# Patient Record
Sex: FEMALE | Race: WHITE | Employment: FULL TIME | URBAN - METROPOLITAN AREA
[De-identification: names, ages, dates, MRNs, and addresses within clinical notes are randomized per-mention and may not be internally consistent; named-entity substitution may affect disease eponyms.]

---

## 2018-04-23 ENCOUNTER — APPOINTMENT (OUTPATIENT)
Dept: CT IMAGING | Age: 33
End: 2018-04-23

## 2018-04-23 ENCOUNTER — HOSPITAL ENCOUNTER (EMERGENCY)
Age: 33
Discharge: HOME | End: 2018-04-23
Attending: EMERGENCY MEDICINE

## 2018-04-23 VITALS
RESPIRATION RATE: 18 BRPM | DIASTOLIC BLOOD PRESSURE: 96 MMHG | HEIGHT: 65 IN | OXYGEN SATURATION: 99 % | WEIGHT: 232 LBS | SYSTOLIC BLOOD PRESSURE: 146 MMHG | HEART RATE: 98 BPM | BODY MASS INDEX: 38.65 KG/M2 | TEMPERATURE: 98.4 F

## 2018-04-23 DIAGNOSIS — M54.50 ACUTE EXACERBATION OF CHRONIC LOW BACK PAIN: ICD-10-CM

## 2018-04-23 DIAGNOSIS — M54.31 SCIATICA OF RIGHT SIDE: Primary | ICD-10-CM

## 2018-04-23 DIAGNOSIS — G89.29 ACUTE EXACERBATION OF CHRONIC LOW BACK PAIN: ICD-10-CM

## 2018-04-23 DIAGNOSIS — S39.012A BACK STRAIN, INITIAL ENCOUNTER: ICD-10-CM

## 2018-04-23 PROCEDURE — 6360000002 HC RX W HCPCS: Performed by: EMERGENCY MEDICINE

## 2018-04-23 PROCEDURE — 96372 THER/PROPH/DIAG INJ SC/IM: CPT

## 2018-04-23 PROCEDURE — 99283 EMERGENCY DEPT VISIT LOW MDM: CPT

## 2018-04-23 PROCEDURE — 72131 CT LUMBAR SPINE W/O DYE: CPT

## 2018-04-23 RX ORDER — METHYLPREDNISOLONE 4 MG/1
TABLET ORAL
Qty: 1 KIT | Status: SHIPPED | OUTPATIENT
Start: 2018-04-23 | End: 2018-04-29

## 2018-04-23 RX ORDER — KETOROLAC TROMETHAMINE 30 MG/ML
30 INJECTION, SOLUTION INTRAMUSCULAR; INTRAVENOUS ONCE
Status: COMPLETED | OUTPATIENT
Start: 2018-04-23 | End: 2018-04-23

## 2018-04-23 RX ORDER — METHOCARBAMOL 500 MG/1
500 TABLET, FILM COATED ORAL 4 TIMES DAILY
Qty: 40 TABLET | Refills: 0 | Status: SHIPPED | OUTPATIENT
Start: 2018-04-23 | End: 2018-05-03

## 2018-04-23 RX ADMIN — KETOROLAC TROMETHAMINE 30 MG: 30 INJECTION, SOLUTION INTRAMUSCULAR at 21:45

## 2018-04-23 ASSESSMENT — PAIN DESCRIPTION - PAIN TYPE
TYPE: ACUTE PAIN
TYPE: ACUTE PAIN

## 2018-04-23 ASSESSMENT — PAIN SCALES - GENERAL
PAINLEVEL_OUTOF10: 4
PAINLEVEL_OUTOF10: 8

## 2018-04-23 ASSESSMENT — PAIN DESCRIPTION - LOCATION
LOCATION: BACK
LOCATION: BACK

## 2018-04-23 ASSESSMENT — PAIN DESCRIPTION - ORIENTATION: ORIENTATION: RIGHT;LOWER

## 2018-04-23 ASSESSMENT — PAIN DESCRIPTION - DESCRIPTORS
DESCRIPTORS: DISCOMFORT
DESCRIPTORS: CONSTANT;SHARP

## 2019-09-04 ENCOUNTER — APPOINTMENT (OUTPATIENT)
Dept: GENERAL RADIOLOGY | Age: 34
End: 2019-09-04
Payer: COMMERCIAL

## 2019-09-04 ENCOUNTER — HOSPITAL ENCOUNTER (EMERGENCY)
Age: 34
Discharge: HOME | End: 2019-09-04
Attending: EMERGENCY MEDICINE
Payer: COMMERCIAL

## 2019-09-04 VITALS
WEIGHT: 236 LBS | BODY MASS INDEX: 39.32 KG/M2 | SYSTOLIC BLOOD PRESSURE: 126 MMHG | OXYGEN SATURATION: 99 % | TEMPERATURE: 98 F | HEART RATE: 96 BPM | RESPIRATION RATE: 16 BRPM | DIASTOLIC BLOOD PRESSURE: 70 MMHG | HEIGHT: 65 IN

## 2019-09-04 DIAGNOSIS — J06.9 ACUTE UPPER RESPIRATORY INFECTION: Primary | ICD-10-CM

## 2019-09-04 PROCEDURE — 71046 X-RAY EXAM CHEST 2 VIEWS: CPT

## 2019-09-04 PROCEDURE — 99283 EMERGENCY DEPT VISIT LOW MDM: CPT

## 2019-09-04 RX ORDER — AMOXICILLIN 875 MG/1
875 TABLET, COATED ORAL 2 TIMES DAILY
Qty: 20 TABLET | Refills: 0 | Status: SHIPPED | OUTPATIENT
Start: 2019-09-04 | End: 2019-09-14

## 2019-09-04 NOTE — ED PROVIDER NOTES
within the ED encounter and vital signs as below have been reviewed. /70   Pulse 96   Temp 98 °F (36.7 °C) (Oral)   Resp 16   Ht 5' 5\" (1.651 m)   Wt 236 lb (107 kg)   LMP 08/16/2019   SpO2 99%   BMI 39.27 kg/m²   Oxygen Saturation Interpretation: Normal    ---------------------------------------------------PHYSICAL EXAM--------------------------------------    Constitutional/General: Alert and oriented x3, well appearing, non toxic in NAD  Head: NC/AT  Eyes: PERRL, EOMI  Mouth: Oropharynx clear, handling secretions, no trismus  Neck: Supple, full ROM,   Pulmonary: Lungs clear to auscultation bilaterally, no wheezes, rales, or rhonchi. Not in respiratory distress  Cardiovascular:  Regular rate and rhythm, no murmurs, gallops, or rubs. 2+ distal pulses  Abdomen: Soft, non tender, non distended,   Extremities: Moves all extremities x 4. Warm and well perfused  Skin: warm and dry without rash  Neurologic: GCS 15,  Psych: Normal Affect      ------------------------------ ED COURSE/MEDICAL DECISION MAKING----------------------  Medications - No data to display    Medical Decision Making:    Pt with a Hx of HTN and chronic bronchitis presents for productive cough and nasal congestion. Imaging obtained, reviewed; No acute findings. Results discussed with patient. Pt will be d/c with prescriptions for amoxicillin and will follow up with her PCP. She is educated on signs and symptoms that require emergent evaluation. Pt is advised to return to the ED if her symptoms change or worsen. If her pain persists, pt may need further evaluation. Pt is agreeable to plan and all questions have been answered at this time. Counseling: The emergency provider has spoken with the patient and discussed todays results, in addition to providing specific details for the plan of care and counseling regarding the diagnosis and prognosis.   Questions are answered at this time and they are agreeable with the

## 2020-06-20 ENCOUNTER — APPOINTMENT (OUTPATIENT)
Dept: GENERAL RADIOLOGY | Age: 35
End: 2020-06-20
Payer: COMMERCIAL

## 2020-06-20 ENCOUNTER — HOSPITAL ENCOUNTER (EMERGENCY)
Age: 35
Discharge: HOME | End: 2020-06-20
Attending: EMERGENCY MEDICINE
Payer: COMMERCIAL

## 2020-06-20 VITALS
DIASTOLIC BLOOD PRESSURE: 84 MMHG | WEIGHT: 182 LBS | RESPIRATION RATE: 16 BRPM | HEIGHT: 65 IN | HEART RATE: 92 BPM | BODY MASS INDEX: 30.32 KG/M2 | TEMPERATURE: 98.4 F | OXYGEN SATURATION: 98 % | SYSTOLIC BLOOD PRESSURE: 136 MMHG

## 2020-06-20 PROCEDURE — 73562 X-RAY EXAM OF KNEE 3: CPT

## 2020-06-20 PROCEDURE — 99283 EMERGENCY DEPT VISIT LOW MDM: CPT

## 2020-06-20 PROCEDURE — 6370000000 HC RX 637 (ALT 250 FOR IP): Performed by: EMERGENCY MEDICINE

## 2020-06-20 RX ORDER — HYDROCODONE BITARTRATE AND ACETAMINOPHEN 5; 325 MG/1; MG/1
1 TABLET ORAL EVERY 6 HOURS PRN
Qty: 12 TABLET | Refills: 0 | Status: SHIPPED | OUTPATIENT
Start: 2020-06-20 | End: 2020-06-23

## 2020-06-20 RX ORDER — IBUPROFEN 600 MG/1
600 TABLET ORAL ONCE
Status: COMPLETED | OUTPATIENT
Start: 2020-06-20 | End: 2020-06-20

## 2020-06-20 RX ADMIN — IBUPROFEN 600 MG: 600 TABLET, FILM COATED ORAL at 19:22

## 2020-06-20 ASSESSMENT — PAIN DESCRIPTION - DESCRIPTORS: DESCRIPTORS: ACHING

## 2020-06-20 ASSESSMENT — PAIN SCALES - GENERAL
PAINLEVEL_OUTOF10: 6
PAINLEVEL_OUTOF10: 6

## 2020-06-20 ASSESSMENT — PAIN DESCRIPTION - LOCATION: LOCATION: KNEE

## 2020-06-20 ASSESSMENT — PAIN DESCRIPTION - ORIENTATION: ORIENTATION: RIGHT

## 2020-06-20 NOTE — ED PROVIDER NOTES
analysis:[id number]}      ---------------------------------------------------PHYSICAL EXAM--------------------------------------      Constitutional/General: Alert and oriented x3, well appearing, non toxic in NAD  Head: Normocephalic and atraumatic  Eyes: PERRL, EOMI  Mouth: Oropharynx clear, handling secretions, no trismus  Neck: Supple, full ROM,   Pulmonary: Lungs clear to auscultation bilaterally, no wheezes, rales, or rhonchi. Not in respiratory distress  Cardiovascular:  Regular rate and rhythm, no murmurs, gallops, or rubs. 2+ distal pulses  Abdomen: Soft, non tender, non distended,   Extremities: Moves all extremities x 4. Warm and well perfused  Skin: warm and dry without rash  Neurologic: GCS 15,  Psych: Normal Affect      ------------------------------ ED COURSE/MEDICAL DECISION MAKING----------------------  Medications - No data to display      ED COURSE:       Medical Decision Making:    ***    Counseling: The emergency provider has spoken with the {Persons; family members:[id number]} and discussed todays results, in addition to providing specific details for the plan of care and counseling regarding the diagnosis and prognosis. Questions are answered at this time and they are agreeable with the plan.      --------------------------------- IMPRESSION AND DISPOSITION ---------------------------------    IMPRESSION  No diagnosis found. DISPOSITION  Disposition: {Plan; disposition:53454}  Patient condition is {condition:86890}      NOTE: This report was transcribed using voice recognition software.  Every effort was made to ensure accuracy; however, inadvertent computerized transcription errors may be present

## 2020-06-30 ENCOUNTER — TELEPHONE (OUTPATIENT)
Dept: ORTHOPEDIC SURGERY | Age: 35
End: 2020-06-30

## 2020-06-30 NOTE — TELEPHONE ENCOUNTER
Pt was seen in the ED on 06/20/2020 for knee sprain and needs a F/U appt. Pt can be reached at [phone]. Thank you.

## 2020-07-10 ENCOUNTER — HOSPITAL ENCOUNTER (OUTPATIENT)
Dept: GENERAL RADIOLOGY | Age: 35
Discharge: HOME | End: 2020-07-12
Payer: COMMERCIAL

## 2020-07-10 ENCOUNTER — OFFICE VISIT (OUTPATIENT)
Dept: ORTHOPEDIC SURGERY | Age: 35
End: 2020-07-10
Payer: COMMERCIAL

## 2020-07-10 VITALS
HEIGHT: 65 IN | HEART RATE: 85 BPM | BODY MASS INDEX: 29.99 KG/M2 | DIASTOLIC BLOOD PRESSURE: 92 MMHG | SYSTOLIC BLOOD PRESSURE: 141 MMHG | WEIGHT: 180 LBS

## 2020-07-10 PROCEDURE — 99203 OFFICE O/P NEW LOW 30 MIN: CPT | Performed by: PHYSICIAN ASSISTANT

## 2020-07-10 PROCEDURE — G8427 DOCREV CUR MEDS BY ELIG CLIN: HCPCS | Performed by: PHYSICIAN ASSISTANT

## 2020-07-10 PROCEDURE — G8419 CALC BMI OUT NRM PARAM NOF/U: HCPCS | Performed by: PHYSICIAN ASSISTANT

## 2020-07-10 PROCEDURE — 4004F PT TOBACCO SCREEN RCVD TLK: CPT | Performed by: PHYSICIAN ASSISTANT

## 2020-07-10 PROCEDURE — 99202 OFFICE O/P NEW SF 15 MIN: CPT | Performed by: PHYSICIAN ASSISTANT

## 2020-07-10 PROCEDURE — 73562 X-RAY EXAM OF KNEE 3: CPT

## 2020-07-10 NOTE — PROGRESS NOTES
reviewed. No pertinent family history. REVIEW OF SYSTEMS:   Skin: no abnormal pigmentation, rash  Eyes: no blurring or eye pain   Ears/Nose/Throat: no hearing loss, tinnitus  Respiratory: No increased work of breathing, no coughing  Cardiovascular: Brisk capillary refill bilaterally, well perfused extremities  Gastrointestinal: no nausea, vomiting  Neurologic: no paralysis, or seizures  Musculoskeletal: Pain to right knee      PHYSICAL EXAM:    VITALS:  BP (!) 141/92 (Site: Left Upper Arm, Position: Sitting, Cuff Size: Medium Adult)   Pulse 85   Ht 5' 5\" (1.651 m)   Wt 180 lb (81.6 kg)   LMP 06/12/2020   BMI 29.95 kg/m²   Constitutional: Oriented to person, place, and time; Answer questions appropriately  HENT: Head: Normocephalic and atraumatic. Eyes: EOMI, ROSALIND  Neck: Supple, trachea midline  Cardiovascular: Brisk capillary refill to all extremities, extremities well perfused  Pulmonary/Chest: No increased work of breathing, no cough  Abdominal: Non-tender, non-distended  Neurologic:  Awake, alert and oriented in three planes.  No gross deficits   Musculoskeletal:    Right lower extremity:  · Skin intact  · No ecchymosis  · Soft tissue swelling about the knee  · Range of motion from 0-110 with pain  · No crepitance about the patella  · Positive tenderness palpation about the patella and about the joint lines medial and lateral  · Very mild palpable effusion  · Stable to varus/valgus stress but there is pain  · Lockman's with stable endpoint but with pain  · Ba's elicits pain laterally  · Motor function intact to PF/DF/EHL  · Sensation intact light touch DP/SP/T/S/S nerve distributions  · DP pulse 2+        DATA:    CBC:   Lab Results   Component Value Date    WBC 9.9 01/24/2017    RBC 4.75 01/24/2017    HGB 13.2 01/24/2017    HCT 40.6 01/24/2017    MCV 85.5 01/24/2017    MCH 27.8 01/24/2017    MCHC 32.5 01/24/2017    RDW 12.5 01/24/2017     01/24/2017    MPV 10.6 01/24/2017     Radiology Review:   XR knee right:  Demonstrates no acute fracture dislocation. There is no appreciable effusion on x-ray. IMPRESSION:  Nonspecific knee sprain    PLAN:  Discussed findings with the patient. Her exam is very nonspecific but there is pain throughout. Ba's does elicit pain in the lateral compartment. She has had increasing pain without illness will obtain MRI to evaluate for possible meniscus tear  If MRI does not demonstrate operative issues will consider corticosteroid injection to the knee and recommend some physical therapy  Patient is agreeable with this plan and will obtain MRI  Return for follow-up after MRI  Keep ice and elevation to the knee  Ace wrap given for compression      I have seen and evaluated the patient with the resident physician and agree with the above assessments on today's visit. I have performed the key components of the history and physical examination and concur completely with the findings and plans as documented above. My personal evaluation and documentation is as below:      SUBJECTIVE: Nilsa Tello is a 37 y.o.  female who presents for right knee pain that has been worsening for several weeks. No improvement with conservative treatments, pain with knee flexion. C/o popping and instability to the knee, no sensation of knee locking. No prior history of issue with right knee. Feeling well otherwise     ROS: All pertinent review of systems are as listed above in HPI and and resident documentation    OBJECTIVE:   Alert and oriented X 3, no acute distress, respirations easy and unlabored with no audible wheezes, skin warm and dry, speech and dress appropriate for noted age, affect euthymic.   Extremity:  Right knee  Skin is C/D/I  No significant effusion  AROM of the knee 0-110 but with pain at terminal ROM  Pain reproduced with McMurrays but no mechanical symptoms  A/P drawer with firm endpoint  TTP about posterior lateral joint line and patella  NVI  Calf supple and nontender    ASSESSMENT:   Diagnosis Orders   1. Acute internal derangement of knee, right  MRI Knee Right WO Contrast       PLAN:  MRI right knee, mechanical symptoms concerning for mensicus injury  Discussed treatment algorithm  Continue ice/nsaids/compression  WBAT  DC KI  Follow up after MRI     Electronically signed by Orrie Cranker, PA-C on 7/12/2020 at 4:54 PM  Note: This report was completed using [employer] voiced recognition software. Every effort has been made to ensure accuracy; however, inadvertent computerized transcription errors may be present.

## 2020-07-21 ENCOUNTER — HOSPITAL ENCOUNTER (OUTPATIENT)
Dept: MRI IMAGING | Age: 35
Discharge: HOME | End: 2020-07-23
Payer: COMMERCIAL

## 2020-07-21 PROCEDURE — 73721 MRI JNT OF LWR EXTRE W/O DYE: CPT

## 2020-07-22 ENCOUNTER — TELEPHONE (OUTPATIENT)
Dept: ORTHOPEDIC SURGERY | Age: 35
End: 2020-07-22

## 2020-07-22 NOTE — TELEPHONE ENCOUNTER
See in office, OK to see sometime next week  Electronically signed by Nuha Kong PA-C on 7/22/2020 at 2:48 PM

## 2020-08-04 ENCOUNTER — HOSPITAL ENCOUNTER (EMERGENCY)
Age: 35
Discharge: HOME | End: 2020-08-04
Attending: EMERGENCY MEDICINE
Payer: COMMERCIAL

## 2020-08-04 VITALS
TEMPERATURE: 98.3 F | DIASTOLIC BLOOD PRESSURE: 71 MMHG | HEART RATE: 87 BPM | WEIGHT: 180 LBS | RESPIRATION RATE: 14 BRPM | BODY MASS INDEX: 29.95 KG/M2 | OXYGEN SATURATION: 95 % | SYSTOLIC BLOOD PRESSURE: 139 MMHG

## 2020-08-04 PROCEDURE — 96374 THER/PROPH/DIAG INJ IV PUSH: CPT

## 2020-08-04 PROCEDURE — 6360000002 HC RX W HCPCS: Performed by: EMERGENCY MEDICINE

## 2020-08-04 PROCEDURE — 99284 EMERGENCY DEPT VISIT MOD MDM: CPT

## 2020-08-04 PROCEDURE — 96372 THER/PROPH/DIAG INJ SC/IM: CPT

## 2020-08-04 PROCEDURE — 6360000002 HC RX W HCPCS

## 2020-08-04 RX ORDER — LORAZEPAM 2 MG/ML
INJECTION INTRAMUSCULAR
Status: COMPLETED
Start: 2020-08-04 | End: 2020-08-04

## 2020-08-04 RX ORDER — NALOXONE HYDROCHLORIDE 0.4 MG/ML
INJECTION, SOLUTION INTRAMUSCULAR; INTRAVENOUS; SUBCUTANEOUS
Status: DISCONTINUED
Start: 2020-08-04 | End: 2020-08-04 | Stop reason: HOSPADM

## 2020-08-04 RX ORDER — LORAZEPAM 2 MG/ML
1 INJECTION INTRAMUSCULAR ONCE
Status: COMPLETED | OUTPATIENT
Start: 2020-08-04 | End: 2020-08-04

## 2020-08-04 RX ORDER — NALOXONE HYDROCHLORIDE 0.4 MG/ML
0.4 INJECTION, SOLUTION INTRAMUSCULAR; INTRAVENOUS; SUBCUTANEOUS ONCE
Status: COMPLETED | OUTPATIENT
Start: 2020-08-04 | End: 2020-08-04

## 2020-08-04 RX ADMIN — NALOXONE HYDROCHLORIDE 0.4 MG: 0.4 INJECTION, SOLUTION INTRAMUSCULAR; INTRAVENOUS; SUBCUTANEOUS at 18:59

## 2020-08-04 RX ADMIN — LORAZEPAM 1 MG: 2 INJECTION INTRAMUSCULAR at 18:18

## 2020-08-04 RX ADMIN — LORAZEPAM 1 MG: 2 INJECTION, SOLUTION INTRAMUSCULAR; INTRAVENOUS at 18:18

## 2020-08-04 NOTE — ED PROVIDER NOTES
antibiotics    -------------------------------------------------- RESULTS -------------------------------------------------  All laboratory and radiology results have been personally reviewed by myself   LABS:  No results found for this visit on 08/04/20. RADIOLOGY:  Interpreted by Radiologist.  No orders to display       ------------------------- NURSING NOTES AND VITALS REVIEWED ---------------------------   The nursing notes within the ED encounter and vital signs as below have been reviewed. /71   Pulse 87   Temp 98.3 °F (36.8 °C) (Oral)   Resp 14   Wt 180 lb (81.6 kg)   LMP 07/05/2020   SpO2 95%   BMI 29.95 kg/m²   Oxygen Saturation Interpretation: Normal      ---------------------------------------------------PHYSICAL EXAM--------------------------------------    Constitutional/General: Alert and oriented x3, well appearing, non toxic in NAD, patient is extremely anxious and upset. Crying. Head: Normocephalic and atraumatic  Eyes: PERRL, EOMI, conjunctiva normal, sclera non icteric  Mouth: Oropharynx clear, handling secretions, no trismus, no asymmetry of the posterior oropharynx or uvular edema  Neck: Supple, full ROM, non tender to palpation in the midline, no stridor, no crepitus, no meningeal signs  Respiratory: Lungs clear to auscultation bilaterally, no wheezes, rales, or rhonchi. Not in respiratory distress. Patient is tachypneic secondary to anxiety attack  Cardiovascular:  Regular rate. Regular rhythm. No murmurs, gallops, or rubs. 2+ distal pulses  Chest: No chest wall tenderness  GI:  Abdomen Soft, Non tender, Non distended. +BS. No organomegaly, no palpable masses,  No rebound, guarding, or rigidity. Musculoskeletal: Moves all extremities x 4. Warm and well perfused, no clubbing, cyanosis, or edema. Capillary refill <3 seconds  Integument: skin warm and dry. No rashes.    Neurologic: GCS 15, no focal deficits, symmetric strength 5/5 in the upper and lower extremities bilaterally  Psychiatric: Anxious appearing      ------------------------------ ED COURSE/MEDICAL DECISION MAKING----------------------  Medications   naloxone (NARCAN) 0.4 MG/ML injection (  Canceled Entry 20)   LORazepam (ATIVAN) injection 1 mg (1 mg Intramuscular Given 20)   naloxone (NARCAN) injection 0.4 mg (0.4 mg Intravenous Given 20)         Medical Decision Makin  Patient will be observed in the ED to make sure that there is no recurrence of apnea/hypoxia after Narcan wears off. Will give patient IM Ativan for anxiety. Counseling: The emergency provider has spoken with the patient and discussed todays results, in addition to providing specific details for the plan of care and counseling regarding the diagnosis and prognosis. Questions are answered at this time and they are agreeable with the plan.      --------------------------------- IMPRESSION AND DISPOSITION ---------------------------------    IMPRESSION  1.  Opiate overdose, accidental or unintentional, initial encounter [facility])        DISPOSITION  Disposition: Discharge to home  Patient condition is good                 Sajan Juares DO  [phone]

## 2020-08-04 NOTE — ED NOTES
Bed: H5  Expected date:   Expected time:   Means of arrival:   Comments:  [address] [place] [address] Shania Reis RN  08/04/20 5650

## 2020-08-11 ENCOUNTER — OFFICE VISIT (OUTPATIENT)
Dept: ORTHOPEDIC SURGERY | Age: 35
End: 2020-08-11
Payer: COMMERCIAL

## 2020-08-11 VITALS
TEMPERATURE: 99.5 F | HEIGHT: 65 IN | DIASTOLIC BLOOD PRESSURE: 88 MMHG | BODY MASS INDEX: 32.49 KG/M2 | SYSTOLIC BLOOD PRESSURE: 141 MMHG | HEART RATE: 81 BPM | WEIGHT: 195 LBS

## 2020-08-11 PROBLEM — S86.911D KNEE STRAIN, RIGHT, SUBSEQUENT ENCOUNTER: Status: ACTIVE | Noted: 2020-08-11

## 2020-08-11 PROCEDURE — 4004F PT TOBACCO SCREEN RCVD TLK: CPT | Performed by: ORTHOPAEDIC SURGERY

## 2020-08-11 PROCEDURE — 99213 OFFICE O/P EST LOW 20 MIN: CPT | Performed by: ORTHOPAEDIC SURGERY

## 2020-08-11 PROCEDURE — G8417 CALC BMI ABV UP PARAM F/U: HCPCS | Performed by: ORTHOPAEDIC SURGERY

## 2020-08-11 PROCEDURE — 99212 OFFICE O/P EST SF 10 MIN: CPT | Performed by: ORTHOPAEDIC SURGERY

## 2020-08-11 PROCEDURE — G8427 DOCREV CUR MEDS BY ELIG CLIN: HCPCS | Performed by: ORTHOPAEDIC SURGERY

## 2020-08-11 NOTE — PROGRESS NOTES
Orthopedic Trauma Surgery  Office Note      CHIEF COMPLAINT:   Chief Complaint   Patient presents with    Knee Pain      Reports pain persisting over the past month. Denies injury/known trauma. To review MRI results this day.  Pain      Reports pain increases as the day day goes on. HISTORY OF PRESENT ILLNESS:                The patient is a 37 y.o. female who presents for follow-up evaluation for her right knee. She has had recent MRI and is here today to discuss and review. States the knee pain is slowly improving. Denies any mechanical symptoms such as catching or locking. States she has good ROM to the knee. Prolonged standing or deep bending still gives her some discomfort. She has been ambulating without assistive device. Works at a local [employer] states she is running back and forth throughout her shifts and has soreness to the knee thereafter. Past Medical History:        Diagnosis Date    H/O chronic bronchitis     most recent in 2013    HTN (hypertension)     Knee strain, right, subsequent encounter 2020    Mental disorder     Migraine     Pregnancy     [facility]    Sciatica      Past Surgical History:        Procedure Laterality Date     SECTION       SECTION      CHOLECYSTECTOMY      CHOLECYSTECTOMY, LAPAROSCOPIC N/A 10-10-13    HERNIA REPAIR      TUBAL LIGATION       Current Medications:   No current facility-administered medications for this visit. Allergies:  Sulfa antibiotics    Social History:   TOBACCO:   reports that she has been smoking. She has a 5.00 pack-year smoking history. She has never used smokeless tobacco.  ETOH:   reports no history of alcohol use. DRUGS:   reports no history of drug use. ACTIVITIES OF DAILY LIVING:    OCCUPATION:    Family History:   History reviewed. No pertinent family history.     REVIEW OF SYSTEMS:   Skin: no abnormal pigmentation, rash  Eyes: no blurring or eye pain   Ears/Nose/Throat: no hearing loss, tinnitus  Respiratory: No increased work of breathing, no coughing  Cardiovascular: Brisk capillary refill bilaterally, well perfused extremities  Gastrointestinal: no nausea, vomiting  Neurologic: no paralysis, or seizures  Musculoskeletal: Pain to right knee      PHYSICAL EXAM:    VITALS:  BP (!) 141/88   Pulse 81   Temp 99.5 °F (37.5 °C)   Ht 5' 5\" (1.651 m)   Wt 195 lb (88.5 kg)   BMI 32.45 kg/m²   Constitutional: Oriented to person, place, and time; Answer questions appropriately  HENT: Head: Normocephalic and atraumatic. Eyes: EOMI, ROSALIND  Neck: Supple, trachea midline  Cardiovascular: Brisk capillary refill to all extremities, extremities well perfused  Pulmonary/Chest: No increased work of breathing, no cough  Abdominal: Non-tender, non-distended  Neurologic:  Awake, alert and oriented in three planes. No gross deficits   Musculoskeletal:    Right lower extremity:  · Skin intact  · No ecchymosis, no effusion  · No focal tenderness to palpation, mild global tenderness peripatellar proximal to the joint line, negative for medial lateral joint line tenderness  · Range of motion from 0-115 actively without pain  · No crepitance about the patella  · Stable to varus/valgus stress but there is pain  · Lachman's with stable endpoint   · Ba's negative  · Motor function intact to PF/DF/EHL  · Sensation intact light touch DP/SP/T/S/S nerve distributions  · DP pulse 2+  · Calf supple nontender        DATA:    CBC:   Lab Results   Component Value Date    WBC 9.9 2017    RBC 4.75 2017    HGB 13.2 2017    HCT 40.6 2017    MCV 85.5 2017    MCH 27.8 2017    MCHC 32.5 2017    RDW 12.5 2017     2017    MPV 10.6 2017     Radiology Review:   XR knee right:  Demonstrates no acute fracture dislocation. There is no appreciable effusion on x-ray.   Narrative    Patient MRN:  [id number]    : 1985    Age: 37 years    Gender: Female      Order Date:  7/21/2020 6:28 PM         EXAM: MRI KNEE RIGHT WO CONTRAST         INDICATION: M23.91 Acute internal derangement of knee, right           COMPARISON: Right knee x-ray 6/20/2020 and right knee x-ray 7/10/2020.         TECHNIQUE: 7 MRI sequences of the knee without IV contrast. Axial T2    fat-sat. Coronal T1, PD and PD fat sat. Sagittal PD, PD fat sat, and    ACL sequence.                   FINDINGS:      There is mild bone marrow edema consistent with a healing bone    contusion involving the lateral margin of the lateral femoral condyle. There is also faint edema along the posterior lateral proximal tibial    margin consistent with a healing bone contusion. The tibial injury    parallels the proximal tibiofibular joint. No edema in the medial    femoral condyle or medial proximal tibia. A very small knee joint    effusion. No edema of the patella. Normal patellar position and normal    thickness of the quadriceps and infrapatellar tendons.         The lateral meniscus shows slight increased intrameniscal signal along    the superior meniscocapsular attachment adjacent to the area of    femoral edema. However a discrete tear is not seen.         There is slight thickening of the lateral collateral ligament at the    femoral attachment without any edema. This could be old healed injury.         Normal appearance of the anterior and posterior cruciate ligaments. Normal appearance of the medial collateral ligament and Medial    meniscus.         There is no degenerative narrowing of the medial or lateral    compartment. No obvious cartilage loss. Minimal calf edema noted.                        Impression         1. Faint residual of bone contusions in the lateral femoral condyle    and posterior lateral proximal tibial margins.              2.  Slight thickening of the lateral collateral ligament attachment to    the lateral femoral condyle, but without any active edema and this    looks probably healed.         3. No definite meniscal tear. 4. Tiny joint effusion. IMPRESSION:  Nonspecific knee sprain    PLAN:  Reviewed MRI results with the patient discussed her underlying bony contusions however ligamentously knee appears to be stable and there is no obvious meniscal injury   Recommend patient obtains supportive knee brace, she can continue with NSAIDs and ice  Discussed activity modifications  Patient to follow-up in office in 6 weeks for repeat evaluation or sooner if needed    Electronically signed by Jocelyn Gilmore DO on 8/11/2020     Note: This report was completed using [employer] voiced recognition software. Every effort has been made to ensure accuracy; however, inadvertent computerized transcription errors may be present.

## 2020-09-23 ENCOUNTER — TELEPHONE (OUTPATIENT)
Dept: ORTHOPEDIC SURGERY | Age: 35
End: 2020-09-23

## 2020-09-23 NOTE — TELEPHONE ENCOUNTER
Left message to call the office, we need to move her appointment, Dr. Anthony Juares will not be in the office after 1:30.     Electronically signed by Teddy Sanon MA on 9/23/2020 at 3:46 PM

## 2020-09-24 NOTE — TELEPHONE ENCOUNTER
Third attempt to reach patient. Left message.   Electronically signed by Dudley Rivas MA on 9/24/2020 at 1:02 PM

## 2020-12-05 ENCOUNTER — APPOINTMENT (OUTPATIENT)
Dept: GENERAL RADIOLOGY | Age: 35
End: 2020-12-05
Payer: COMMERCIAL

## 2020-12-05 ENCOUNTER — HOSPITAL ENCOUNTER (EMERGENCY)
Age: 35
Discharge: HOME | End: 2020-12-05
Attending: EMERGENCY MEDICINE
Payer: COMMERCIAL

## 2020-12-05 VITALS
WEIGHT: 202 LBS | TEMPERATURE: 98.3 F | SYSTOLIC BLOOD PRESSURE: 138 MMHG | RESPIRATION RATE: 16 BRPM | HEIGHT: 65 IN | BODY MASS INDEX: 33.66 KG/M2 | HEART RATE: 79 BPM | OXYGEN SATURATION: 98 % | DIASTOLIC BLOOD PRESSURE: 78 MMHG

## 2020-12-05 LAB
ANION GAP SERPL CALCULATED.3IONS-SCNC: 10 MMOL/L (ref 7–16)
BASOPHILS ABSOLUTE: 0.03 E9/L (ref 0–0.2)
BASOPHILS RELATIVE PERCENT: 0.3 % (ref 0–2)
BUN BLDV-MCNC: 16 MG/DL (ref 6–20)
CALCIUM SERPL-MCNC: 9.2 MG/DL (ref 8.6–10.2)
CHLORIDE BLD-SCNC: 106 MMOL/L (ref 98–107)
CO2: 20 MMOL/L (ref 22–29)
CREAT SERPL-MCNC: 0.6 MG/DL (ref 0.5–1)
D DIMER: <200 NG/ML DDU
EOSINOPHILS ABSOLUTE: 0.06 E9/L (ref 0.05–0.5)
EOSINOPHILS RELATIVE PERCENT: 0.6 % (ref 0–6)
GFR AFRICAN AMERICAN: >60
GFR NON-AFRICAN AMERICAN: >60 ML/MIN/1.73
GLUCOSE BLD-MCNC: 99 MG/DL (ref 74–99)
HCG, URINE, POC: NEGATIVE
HCT VFR BLD CALC: 42.8 % (ref 34–48)
HEMOGLOBIN: 14.8 G/DL (ref 11.5–15.5)
IMMATURE GRANULOCYTES #: 0.03 E9/L
IMMATURE GRANULOCYTES %: 0.3 % (ref 0–5)
LYMPHOCYTES ABSOLUTE: 2.54 E9/L (ref 1.5–4)
LYMPHOCYTES RELATIVE PERCENT: 25.5 % (ref 20–42)
Lab: NORMAL
MCH RBC QN AUTO: 30 PG (ref 26–35)
MCHC RBC AUTO-ENTMCNC: 34.6 % (ref 32–34.5)
MCV RBC AUTO: 86.8 FL (ref 80–99.9)
MONOCYTES ABSOLUTE: 0.55 E9/L (ref 0.1–0.95)
MONOCYTES RELATIVE PERCENT: 5.5 % (ref 2–12)
NEGATIVE QC PASS/FAIL: NORMAL
NEUTROPHILS ABSOLUTE: 6.77 E9/L (ref 1.8–7.3)
NEUTROPHILS RELATIVE PERCENT: 67.8 % (ref 43–80)
PDW BLD-RTO: 11.9 FL (ref 11.5–15)
PLATELET # BLD: 325 E9/L (ref 130–450)
PMV BLD AUTO: 10.4 FL (ref 7–12)
POSITIVE QC PASS/FAIL: NORMAL
POTASSIUM REFLEX MAGNESIUM: 3.9 MMOL/L (ref 3.5–5)
RBC # BLD: 4.93 E12/L (ref 3.5–5.5)
SODIUM BLD-SCNC: 136 MMOL/L (ref 132–146)
TROPONIN: <0.01 NG/ML (ref 0–0.03)
WBC # BLD: 10 E9/L (ref 4.5–11.5)

## 2020-12-05 PROCEDURE — 84484 ASSAY OF TROPONIN QUANT: CPT

## 2020-12-05 PROCEDURE — U0003 INFECTIOUS AGENT DETECTION BY NUCLEIC ACID (DNA OR RNA); SEVERE ACUTE RESPIRATORY SYNDROME CORONAVIRUS 2 (SARS-COV-2) (CORONAVIRUS DISEASE [COVID-19]), AMPLIFIED PROBE TECHNIQUE, MAKING USE OF HIGH THROUGHPUT TECHNOLOGIES AS DESCRIBED BY CMS-2020-01-R: HCPCS

## 2020-12-05 PROCEDURE — 80048 BASIC METABOLIC PNL TOTAL CA: CPT

## 2020-12-05 PROCEDURE — 2580000003 HC RX 258: Performed by: EMERGENCY MEDICINE

## 2020-12-05 PROCEDURE — 93005 ELECTROCARDIOGRAM TRACING: CPT | Performed by: EMERGENCY MEDICINE

## 2020-12-05 PROCEDURE — 85378 FIBRIN DEGRADE SEMIQUANT: CPT

## 2020-12-05 PROCEDURE — 85025 COMPLETE CBC W/AUTO DIFF WBC: CPT

## 2020-12-05 PROCEDURE — 99284 EMERGENCY DEPT VISIT MOD MDM: CPT

## 2020-12-05 PROCEDURE — 71045 X-RAY EXAM CHEST 1 VIEW: CPT

## 2020-12-05 RX ORDER — 0.9 % SODIUM CHLORIDE 0.9 %
1000 INTRAVENOUS SOLUTION INTRAVENOUS ONCE
Status: COMPLETED | OUTPATIENT
Start: 2020-12-05 | End: 2020-12-05

## 2020-12-05 RX ADMIN — SODIUM CHLORIDE 1000 ML: 9 INJECTION, SOLUTION INTRAVENOUS at 13:18

## 2020-12-05 NOTE — ED PROVIDER NOTES
Patient is a 22-year-old female with past medical history of mood disorder, hypertension, migraines presented to the emergency department with shortness of breath. Symptoms moderate in severity and constant since onset. Patient states yesterday she felt very fatigued and what is lying on the house. She had no other complaints at the time is just feeling tired. States she woke up today and while she was standing in the kitchen had sudden onset of shortness of breath. She has been short of breath since this started this morning. She has no history of lung problems and no underlying lung disease. She is also complaining of some tightness in her chest and that is when she feels short of breath. She has no pleuritic chest pain and states that exertion does not make the shortness of breath or chest pain worse. It is \"just there all the time\". The chest pain is described as a tightness in the middle of her chest, does not radiate anywhere, she has never had this before. She does have a history of panic attacks and states this does feel somewhat different. She denies new any new stressors at home but states she has had stress at work but that is not new. No one around her has been sick. Of note she states she did wake up last night and have subjective fevers throughout the night and chills. She tried nothing at home for her symptoms and cannot recall anything makes her symptoms better or worse, there is always there. She has no known sick contacts. She denies any exposure to COVID-19. She denies any history of DVTs or PEs. She denies any leg pain, leg swelling, hemoptysis, recent surgery, long travel. Review of Systems   Constitutional: Positive for chills, fatigue and fever (subjective). HENT: Negative for congestion, sore throat and trouble swallowing. Eyes: Negative for pain. Respiratory: Positive for chest tightness and shortness of breath. Negative for cough and wheezing. Cardiovascular: Positive for chest pain. Negative for palpitations and leg swelling. Gastrointestinal: Negative for abdominal pain, diarrhea, nausea and vomiting. Genitourinary: Negative for dysuria and frequency. Musculoskeletal: Positive for myalgias. Negative for neck pain and neck stiffness. Skin: Negative for rash and wound. Neurological: Negative for weakness and headaches. All other systems reviewed and are negative. Physical Exam  Vitals signs and nursing note reviewed. Constitutional:       General: She is not in acute distress. Appearance: She is well-developed. She is not ill-appearing. Comments: Tearful on exam   HENT:      Head: Normocephalic and atraumatic. Nose: No congestion or rhinorrhea. Eyes:      Extraocular Movements: Extraocular movements intact. Pupils: Pupils are equal, round, and reactive to light. Neck:      Musculoskeletal: Normal range of motion and neck supple. No muscular tenderness. Cardiovascular:      Rate and Rhythm: Regular rhythm. Tachycardia present. Pulses: Normal pulses. Pulmonary:      Effort: Pulmonary effort is normal. No respiratory distress. Breath sounds: Normal breath sounds. No wheezing or rales. Abdominal:      Palpations: Abdomen is soft. Tenderness: There is no abdominal tenderness. There is no guarding or rebound. Musculoskeletal:         General: No swelling or tenderness. Skin:     General: Skin is warm and dry. Capillary Refill: Capillary refill takes less than 2 seconds. Neurological:      Mental Status: She is alert and oriented to person, place, and time. Psychiatric:      Comments: Tearful on exam, mildly flat affect          Procedures     MDM  Number of Diagnoses or Management Options  COVID-19 virus test result unknown:   Shortness of breath:   Viral illness:   Patient presents emergency department for shortness of breath.   She is clinically stable, tachycardic and nontoxic-appearing. History and physical exam less concerning for acute ACS and EKG with no ischemic changes and troponin less than 0.01. She is Wells low risk but not PERC negative secondary to tachycardia so D-dimer was performed and less than 200 making acute PE less likely. Rest of patient's lab work essentially unremarkable. Patient was tearful on exam and has flat affect and history of panic attacks, there is possibility that she had a panic attack this morning but does not feel like prior tracks. Symptoms also somewhat concerning for viral infection including but not limited to COVID-19. Patient will be sent an outpatient swab. With reassuring lab work and imaging studies, patient okay for close outpatient follow-up. Educated patient on symptoms, diagnosis and supportive care at home. Instructed this could be COVID-19 and long discussion had about self proning and strict return precautions discussed. With patient not hypoxic, no recommendations for steroids at this time. Patient verbalized understanding is agreeable with the plan. All questions were answered. Patient was discharged. ED Course as of Dec 06 0927   Sat Dec 05, 2020   1322 ATTENDING PROVIDER ATTESTATION:     Rodolfo Moreno presented to the emergency department for evaluation of [unfilled]  I have reviewed and discussed the case, including pertinent history (medical, surgical, family and social) and exam findings with the Midlevel and the Nurse assigned to Rodolfo Moreno. I have personally performed and/or participated in the history, exam, medical decision making, and procedures and agree with all pertinent clinical information. I have reviewed my findings and recommendations with Rodolfo oMreno and members of family present at the time of disposition. My findings/plan: Patient is a 81-year-old female who presents the chief complaint of sudden onset of chest tightness and shortness of breath.   Patient states that yesterday she started having some chest tightness that resolved but then this morning she was making breakfast for her younger son she had onset of substernal chest tightness, states that she feels like someone is squeezing her chest.  She also missed to having tingling sensations of her fingertips. She denies any associated lightheadedness, dizziness, abdominal pain, nausea, vomiting. Patient states that she does have some stress at work but otherwise at home there is nothing that is more stressful. She denies any history of anxiety or depression. Denies any recent illness. No history of DVT/PE, recent travel, recent surgery, unilateral leg swelling, known malignancy or coagulopathy. No history of cardiac disease in young family members. No history of any heart problems. No history of MI or recent stress test.  On examination she is tearful. Clear breath sounds in all lung fields. Regular rate and rhythm of the heart. No abdominal tenderness palpation. No lower extremity edema. Patient is awake, alert, oriented x3. No focal neurological deficit. Daiana Parrish DO      [MS]      ED Course User Index  [MS] Beatriz Snowden DO      --------------------------------------------- PAST HISTORY ---------------------------------------------  Past Medical History:  has a past medical history of H/O chronic bronchitis, HTN (hypertension), Knee strain, right, subsequent encounter, Mental disorder, Migraine, Pregnancy, and Sciatica. Past Surgical History:  has a past surgical history that includes  section ();  section (); Cholecystectomy; Cholecystectomy, laparoscopic (N/A, 10-10-13); Tubal ligation; and hernia repair. Social History:  reports that she has been smoking. She has a 2.50 pack-year smoking history. She has never used smokeless tobacco. She reports that she does not drink alcohol or use drugs. Family History: family history is not on file.      The patients home medications have been reviewed.     Allergies: Sulfa antibiotics    -------------------------------------------------- RESULTS -------------------------------------------------  Labs:  Results for orders placed or performed during the hospital encounter of 12/05/20   CBC Auto Differential   Result Value Ref Range    WBC 10.0 4.5 - 11.5 E9/L    RBC 4.93 3.50 - 5.50 E12/L    Hemoglobin 14.8 11.5 - 15.5 g/dL    Hematocrit 42.8 34.0 - 48.0 %    MCV 86.8 80.0 - 99.9 fL    MCH 30.0 26.0 - 35.0 pg    MCHC 34.6 (H) 32.0 - 34.5 %    RDW 11.9 11.5 - 15.0 fL    Platelets 799 240 - 818 E9/L    MPV 10.4 7.0 - 12.0 fL    Neutrophils % 67.8 43.0 - 80.0 %    Immature Granulocytes % 0.3 0.0 - 5.0 %    Lymphocytes % 25.5 20.0 - 42.0 %    Monocytes % 5.5 2.0 - 12.0 %    Eosinophils % 0.6 0.0 - 6.0 %    Basophils % 0.3 0.0 - 2.0 %    Neutrophils Absolute 6.77 1.80 - 7.30 E9/L    Immature Granulocytes # 0.03 E9/L    Lymphocytes Absolute 2.54 1.50 - 4.00 E9/L    Monocytes Absolute 0.55 0.10 - 0.95 E9/L    Eosinophils Absolute 0.06 0.05 - 0.50 E9/L    Basophils Absolute 0.03 0.00 - 0.20 K1/J   Basic Metabolic Panel w/ Reflex to MG   Result Value Ref Range    Sodium 136 132 - 146 mmol/L    Potassium reflex Magnesium 3.9 3.5 - 5.0 mmol/L    Chloride 106 98 - 107 mmol/L    CO2 20 (L) 22 - 29 mmol/L    Anion Gap 10 7 - 16 mmol/L    Glucose 99 74 - 99 mg/dL    BUN 16 6 - 20 mg/dL    CREATININE 0.6 0.5 - 1.0 mg/dL    GFR Non-African American >60 >=60 mL/min/1.73    GFR African American >60     Calcium 9.2 8.6 - 10.2 mg/dL   Troponin   Result Value Ref Range    Troponin <0.01 0.00 - 0.03 ng/mL   D-Dimer, Quantitative   Result Value Ref Range    D-Dimer, Quant <200 ng/mL DDU   Covid-19 Ambulatory   Result Value Ref Range    Source OP swab    POC Pregnancy Urine Qual   Result Value Ref Range    HCG, Urine, POC Negative Negative    Lot Number [id number]     Positive QC Pass/Fail Pass     Negative QC Pass/Fail Pass    EKG 12 Lead   Result Value Ref Range    Ventricular Rate 79 BPM    Atrial Rate 79 BPM    P-R Interval 146 ms    QRS Duration 86 ms    Q-T Interval 382 ms    QTc Calculation (Bazett) 438 ms    P Axis 39 degrees    R Axis 88 degrees    T Axis 54 degrees       Radiology:  XR CHEST PORTABLE   Final Result   No pneumonia or pleural effusion. EKG: This EKG is signed and interpreted by ED Physician. Time:  1307   Rate: 79  Rhythm: Sinus. Interpretation: no acute changes. QTc 438. Normal axis deviation. No acute ST segment changes. Comparison: stable as compared to patient's most recent EKG.      ------------------------- NURSING NOTES AND VITALS REVIEWED ---------------------------  Date / Time Roomed:  12/5/2020 12:22 PM  ED Bed Assignment:  02/02    The nursing notes within the ED encounter and vital signs as below have been reviewed. /78   Pulse 79   Temp 98.3 °F (36.8 °C) (Oral)   Resp 16   Ht 5' 4.5\" (1.638 m)   Wt 202 lb (91.6 kg)   LMP 11/21/2020 (Exact Date)   SpO2 98%   BMI 34.14 kg/m²   Oxygen Saturation Interpretation: Normal      ------------------------------------------ PROGRESS NOTES ------------------------------------------  ED COURSE MEDICATIONS:                Medications   0.9 % sodium chloride bolus (0 mLs Intravenous Stopped 12/5/20 3368)       I have spoken with the patient and discussed todays results, in addition to providing specific details for the plan of care and counseling regarding the diagnosis and prognosis. Their questions are answered at this time and they are agreeable with the plan. I discussed at length with them reasons for immediate return here for re evaluation. They will followup with primary care by calling their office tomorrow. --------------------------------- ADDITIONAL PROVIDER NOTES ---------------------------------  At this time the patient is without objective evidence of an acute process requiring hospitalization or inpatient management.   They have remained hemodynamically stable throughout their entire ED visit and are stable for discharge with outpatient follow-up. The plan has been discussed in detail and they are aware of the specific conditions for emergent return, as well as the importance of follow-up. Discharge Medication List as of 12/5/2020  3:05 PM          Diagnosis:  1. Shortness of breath    2. Viral illness    3. COVID-19 virus test result unknown        Disposition:  Patient's disposition: Discharge to home  Patient's condition is stable.               Marysol Redd, DO  Resident  12/06/20 8072

## 2020-12-06 LAB
EKG ATRIAL RATE: 79 BPM
EKG P AXIS: 39 DEGREES
EKG P-R INTERVAL: 146 MS
EKG Q-T INTERVAL: 382 MS
EKG QRS DURATION: 86 MS
EKG QTC CALCULATION (BAZETT): 438 MS
EKG R AXIS: 88 DEGREES
EKG T AXIS: 54 DEGREES
EKG VENTRICULAR RATE: 79 BPM

## 2020-12-06 PROCEDURE — 93010 ELECTROCARDIOGRAM REPORT: CPT | Performed by: INTERNAL MEDICINE

## 2020-12-06 ASSESSMENT — ENCOUNTER SYMPTOMS
CHEST TIGHTNESS: 1
DIARRHEA: 0
ABDOMINAL PAIN: 0
SHORTNESS OF BREATH: 1
EYE PAIN: 0
WHEEZING: 0
SORE THROAT: 0
TROUBLE SWALLOWING: 0
NAUSEA: 0
COUGH: 0
VOMITING: 0

## 2020-12-07 ENCOUNTER — CARE COORDINATION (OUTPATIENT)
Dept: CARE COORDINATION | Age: 35
End: 2020-12-07

## 2020-12-07 NOTE — CARE COORDINATION
Patient contacted regarding Tomeka Singleton. Discussed COVID-19 related testing which was pending at this time. Test results were pending. Patient informed of results, if available? Results pending at this time. Pt encouraged to monitor COVID-19 results through [address] Care Transition Nurse/ Ambulatory Care Manager contacted the patient by telephone to perform post discharge assessment. Call within 2 business days of discharge: Yes. Verified name and  with patient as identifiers. Provided introduction to self, and explanation of the CTN/ACM role, and reason for call due to risk factors for infection and/or exposure to COVID-19. Symptoms reviewed with patient who verbalized the following symptoms: fatigue, cough, shortness of breath, no new symptoms and no worsening symptoms. Due to no new or worsening symptoms encounter was not routed to provider for escalation. Discussed follow-up appointments. If no appointment was previously scheduled, appointment scheduling offered: Yes-Declined. Pt prefers to call herself. RN provided pt with flu clinic information. [facility] follow up appointment(s): No future appointments. Non-[facility] follow up appointment(s):     Non-face-to-face services provided:  Obtained and reviewed discharge summary and/or continuity of care documents     Advance Care Planning:   Does patient have an Advance Directive:  Health care decision maker information verified. Patient has following risk factors of: htn. CTN/ACM reviewed discharge instructions, medical action plan and red flags such as increased shortness of breath, increasing fever and signs of decompensation with patient who verbalized understanding. Discussed exposure protocols and quarantine with CDC Guidelines What to do if you are sick with coronavirus disease .  Patient was given an opportunity for questions and concerns.  The patient agrees to contact the [facility] exposure line [phone], [facility] [address]: ([phone]) and PCP office for questions related to their healthcare. CTN/ACM provided contact information for future needs. Reviewed and educated patient on any new and changed medications related to discharge diagnosis     Patient/family/caregiver given information for [facility] and agrees to enroll yes  Patient's preferred e-mail: prefers text option   Patient's preferred phone number: [phone]  Based on Loop alert triggers, patient will be contacted by nurse care manager for worsening symptoms. Pt will be further monitored by COVID Loop Team based on severity of symptoms and risk factors. Called and spoke with pt to f/u with her from her ED visit on 12/5/20 for viral illness, sob. Pt states that she feels about the same today. Denies fever or chills. +dry cough and sob with exertion. Pt's AKTNL-30 results pending at time of this call. Pt given flu clinic information if needing a f/u since she does not believe her pcp offers VV or would see her in the office d/t current symptoms. Pt was agreeable to enroll in Loop. Pt active with [employer] and is monitoring her results through there.

## 2020-12-08 LAB
SARS-COV-2: NOT DETECTED
SOURCE: NORMAL

## 2021-02-10 ENCOUNTER — HOSPITAL ENCOUNTER (EMERGENCY)
Age: 36
Discharge: HOME | End: 2021-02-10
Payer: COMMERCIAL

## 2021-02-10 VITALS
RESPIRATION RATE: 18 BRPM | SYSTOLIC BLOOD PRESSURE: 122 MMHG | DIASTOLIC BLOOD PRESSURE: 80 MMHG | TEMPERATURE: 96.9 F | OXYGEN SATURATION: 99 % | WEIGHT: 205 LBS | HEIGHT: 64 IN | HEART RATE: 75 BPM | BODY MASS INDEX: 35 KG/M2

## 2021-02-10 DIAGNOSIS — K04.7 DENTAL INFECTION: ICD-10-CM

## 2021-02-10 DIAGNOSIS — K08.89 PAIN, DENTAL: Primary | ICD-10-CM

## 2021-02-10 PROCEDURE — 6370000000 HC RX 637 (ALT 250 FOR IP): Performed by: PHYSICIAN ASSISTANT

## 2021-02-10 PROCEDURE — 99283 EMERGENCY DEPT VISIT LOW MDM: CPT

## 2021-02-10 RX ORDER — AMOXICILLIN AND CLAVULANATE POTASSIUM 875; 125 MG/1; MG/1
1 TABLET, FILM COATED ORAL 2 TIMES DAILY
Qty: 14 TABLET | Refills: 0 | Status: SHIPPED | OUTPATIENT
Start: 2021-02-10 | End: 2021-02-17

## 2021-02-10 RX ORDER — HYDROCODONE BITARTRATE AND ACETAMINOPHEN 5; 325 MG/1; MG/1
1 TABLET ORAL EVERY 6 HOURS PRN
Qty: 8 TABLET | Refills: 0 | Status: SHIPPED | OUTPATIENT
Start: 2021-02-10 | End: 2021-02-12

## 2021-02-10 RX ORDER — NAPROXEN 500 MG/1
500 TABLET ORAL 2 TIMES DAILY
Qty: 60 TABLET | Refills: 0 | Status: SHIPPED | OUTPATIENT
Start: 2021-02-10

## 2021-02-10 RX ORDER — HYDROCODONE BITARTRATE AND ACETAMINOPHEN 5; 325 MG/1; MG/1
1 TABLET ORAL ONCE
Status: COMPLETED | OUTPATIENT
Start: 2021-02-10 | End: 2021-02-10

## 2021-02-10 RX ORDER — LIDOCAINE HYDROCHLORIDE 20 MG/ML
10 SOLUTION OROPHARYNGEAL PRN
Qty: 500 ML | Refills: 2 | Status: SHIPPED | OUTPATIENT
Start: 2021-02-10

## 2021-02-10 RX ADMIN — HYDROCODONE BITARTRATE AND ACETAMINOPHEN 1 TABLET: 5; 325 TABLET ORAL at 13:06

## 2021-02-10 ASSESSMENT — PAIN DESCRIPTION - PROGRESSION: CLINICAL_PROGRESSION: GRADUALLY WORSENING

## 2021-02-10 ASSESSMENT — PAIN DESCRIPTION - LOCATION: LOCATION: TEETH

## 2021-02-10 ASSESSMENT — PAIN SCALES - GENERAL: PAINLEVEL_OUTOF10: 7

## 2021-02-10 NOTE — ED PROVIDER NOTES
[facility]     Department of Emergency Medicine   ED  Provider Note  Admit Date/RoomTime: 2/10/2021 11:46 AM  ED Room:     CHIEF COMPLAINT:   Chief Complaint   Patient presents with    Dental Pain     left lower dental pain starting yesterday, states she is unable to get into her dentist for 2 weeks      ---------------------------------HISTORY OF PRESENT ILLNESS-----------------------------------     Sylvia Bauer is a 31 y.o. female presenting to the ED for left lower dental pain that began yesterday. Patient states she did break a feeling in the tooth next to it a couple weeks ago. She states she went to see her dentist who states there was no issue at that time. Patient is unable to get into her dentist for another 2 weeks. She denies any difficulty with breathing or swallowing. She denies any recent trauma or injury. She states the pain does radiate up into her left ear. She has some mild headache due to the pain. She denies difficulty with handling her secretions. Patient has no chest pain, shortness breath, abdominal pain, nausea, vomiting, or rash. She is alert and oriented x3 and in no apparent distress at this exam.  Patient is nontoxic-appearing. She states she is taking ibuprofen with no relief. Review of Systems:   Pertinent positives and negatives are stated within HPI, all other systems reviewed and are negative.    --------------------------------------------- PAST HISTORY ---------------------------------------------    Past Medical History:  has a past medical history of H/O chronic bronchitis, HTN (hypertension), Knee strain, right, subsequent encounter, Mental disorder, Migraine, Pregnancy, and Sciatica. Past Surgical History:  has a past surgical history that includes  section ();  section (); Cholecystectomy; Cholecystectomy, laparoscopic (N/A, 10-10-13); Tubal ligation; and hernia repair. Social History:  reports that she has been smoking. She has a 2.50 pack-year smoking history. She has never used smokeless tobacco. She reports that she does not drink alcohol or use drugs. Family History: family history is not on file. The patients home medications have been reviewed. Allergies: Sulfa antibiotics  Allergies have been reviewed with patient.     -------------------------------------------------- RESULTS -------------------------------------------------  All laboratory and radiology results have been personally reviewed by myself   LABS:  No results found for this visit on 02/10/21. RADIOLOGY:  Interpreted by Radiologist.  No orders to display     ------------------------- NURSING NOTES AND VITALS REVIEWED ---------------------------  The nursing notes within the ED encounter and vital signs as below have been reviewed. /80   Pulse 75   Temp 96.9 °F (36.1 °C) (Temporal)   Resp 18   Ht 5' 4\" (1.626 m)   Wt 205 lb (93 kg)   SpO2 99%   BMI 35.19 kg/m²   Oxygen Saturation Interpretation: Normal    ---------------------------------------------------PHYSICAL EXAM--------------------------------------    Constitutional/General: Alert and oriented x3, well appearing, NAD  HEENT: NC/AT, EOMI, Airway patent  Mouth: The oropharynx is normal. No pharyngeal erythema, no trismus, uvula midline, floor of the mouth is soft. No tenderness in the submental or submandibular space. No tongue elevation. Pain with percussion to #18. Poor dentition, dental decay, no gingival abscess   Neck: Supple. Non-tender with no lymphadenopathy   CVS: Regular rate and rhythm  Resp: Clear and equal bilaterally with good airflow, no distress  Musculo: Moves all extremities x 4. Warm and well perfused, No edema   Integument:  Normal turgor. Warm, dry, without visible rash, unless noted elsewhere. Neurological:  Motor functions intact.  GCS 15, CN II-XII grossly intact     ------------------------------ ED COURSE/MEDICAL DECISION MAKING----------------------  ED Medications:  Medications   HYDROcodone-acetaminophen (NORCO) 5-325 MG per tablet 1 tablet (1 tablet Oral Given 2/10/21 1306)     Procedures:  None    Consultations:   None    Counseling: The emergency provider has spoken with the patient and discussed todays results, in addition to providing specific details for the plan of care and counseling regarding the diagnosis and prognosis. Questions are answered at this time and they are agreeable with the plan. All results reviewed with pt and all questions answered. Patient understands that she must follow-up with dentist. Patient was advised to return to ED if symptoms worsen or new symptoms develop. Pt remained nontoxic, afebrile, and A&O x4 during this ED visit. They agreed with plan of care, discharge, and importance of follow-up. Pt was in no distress at discharge. Vitals stable. Patient was educated on newly prescribed medication.      --------------------------------- IMPRESSION AND DISPOSITION ---------------------------------    IMPRESSION  1. Pain, dental    2. Dental infection      DISPOSITION  Discharge to home  Patient condition is good    NEW MEDICATIONS   Discharge Medication List as of 2/10/2021  1:02 PM      START taking these medications    Details   amoxicillin-clavulanate (AUGMENTIN) 875-125 MG per tablet Take 1 tablet by mouth 2 times daily for 7 days, Disp-14 tablet, R-0Print      naproxen (NAPROSYN) 500 MG tablet Take 1 tablet by mouth 2 times daily, Disp-60 tablet, R-0Print      HYDROcodone-acetaminophen (NORCO) 5-325 MG per tablet Take 1 tablet by mouth every 6 hours as needed for Pain for up to 2 days. , Disp-8 tablet, R-0Print      lidocaine viscous hcl (XYLOCAINE) 2 % SOLN solution Take 10 mLs by mouth as needed for Dental Pain Swish and spit q2h prn, Disp-500 mL, R-2Print           NOTE: This report was transcribed using voice recognition software.  Every effort was made to ensure accuracy; however, inadvertent computerized transcription errors may be present    END OF PROVIDER NOTE         Sae Roca PA-C  02/10/21 2220

## 2022-11-01 ENCOUNTER — APPOINTMENT (OUTPATIENT)
Dept: CT IMAGING | Age: 37
End: 2022-11-01
Payer: COMMERCIAL

## 2022-11-01 ENCOUNTER — HOSPITAL ENCOUNTER (EMERGENCY)
Age: 37
Discharge: HOME | End: 2022-11-01
Attending: EMERGENCY MEDICINE
Payer: COMMERCIAL

## 2022-11-01 VITALS
RESPIRATION RATE: 16 BRPM | OXYGEN SATURATION: 100 % | DIASTOLIC BLOOD PRESSURE: 95 MMHG | SYSTOLIC BLOOD PRESSURE: 138 MMHG | TEMPERATURE: 98.1 F | HEART RATE: 82 BPM

## 2022-11-01 DIAGNOSIS — R20.2 PARESTHESIA: Primary | ICD-10-CM

## 2022-11-01 LAB
ALBUMIN SERPL-MCNC: 4.1 G/DL (ref 3.5–5.2)
ALP BLD-CCNC: 90 U/L (ref 35–104)
ALT SERPL-CCNC: 13 U/L (ref 0–32)
ANION GAP SERPL CALCULATED.3IONS-SCNC: 10 MMOL/L (ref 7–16)
AST SERPL-CCNC: 15 U/L (ref 0–31)
BASOPHILS ABSOLUTE: 0.04 E9/L (ref 0–0.2)
BASOPHILS RELATIVE PERCENT: 0.5 % (ref 0–2)
BILIRUB SERPL-MCNC: 0.5 MG/DL (ref 0–1.2)
BUN BLDV-MCNC: 13 MG/DL (ref 6–20)
CALCIUM SERPL-MCNC: 9.3 MG/DL (ref 8.6–10.2)
CHLORIDE BLD-SCNC: 100 MMOL/L (ref 98–107)
CO2: 25 MMOL/L (ref 22–29)
CREAT SERPL-MCNC: 0.9 MG/DL (ref 0.5–1)
EOSINOPHILS ABSOLUTE: 0.09 E9/L (ref 0.05–0.5)
EOSINOPHILS RELATIVE PERCENT: 1 % (ref 0–6)
GFR SERPL CREATININE-BSD FRML MDRD: >60 ML/MIN/1.73
GLUCOSE BLD-MCNC: 90 MG/DL (ref 74–99)
HCT VFR BLD CALC: 42.6 % (ref 34–48)
HEMOGLOBIN: 14.2 G/DL (ref 11.5–15.5)
IMMATURE GRANULOCYTES #: 0.02 E9/L
IMMATURE GRANULOCYTES %: 0.2 % (ref 0–5)
LYMPHOCYTES ABSOLUTE: 3.34 E9/L (ref 1.5–4)
LYMPHOCYTES RELATIVE PERCENT: 38.6 % (ref 20–42)
MAGNESIUM: 1.9 MG/DL (ref 1.6–2.6)
MCH RBC QN AUTO: 29.5 PG (ref 26–35)
MCHC RBC AUTO-ENTMCNC: 33.3 % (ref 32–34.5)
MCV RBC AUTO: 88.4 FL (ref 80–99.9)
MONOCYTES ABSOLUTE: 0.67 E9/L (ref 0.1–0.95)
MONOCYTES RELATIVE PERCENT: 7.7 % (ref 2–12)
NEUTROPHILS ABSOLUTE: 4.49 E9/L (ref 1.8–7.3)
NEUTROPHILS RELATIVE PERCENT: 52 % (ref 43–80)
PDW BLD-RTO: 11.9 FL (ref 11.5–15)
PLATELET # BLD: 280 E9/L (ref 130–450)
PMV BLD AUTO: 10.4 FL (ref 7–12)
POTASSIUM REFLEX MAGNESIUM: 3.5 MMOL/L (ref 3.5–5)
RBC # BLD: 4.82 E12/L (ref 3.5–5.5)
SODIUM BLD-SCNC: 135 MMOL/L (ref 132–146)
TOTAL PROTEIN: 7 G/DL (ref 6.4–8.3)
WBC # BLD: 8.7 E9/L (ref 4.5–11.5)

## 2022-11-01 PROCEDURE — 83735 ASSAY OF MAGNESIUM: CPT

## 2022-11-01 PROCEDURE — 36415 COLL VENOUS BLD VENIPUNCTURE: CPT

## 2022-11-01 PROCEDURE — 85025 COMPLETE CBC W/AUTO DIFF WBC: CPT

## 2022-11-01 PROCEDURE — 93005 ELECTROCARDIOGRAM TRACING: CPT | Performed by: EMERGENCY MEDICINE

## 2022-11-01 PROCEDURE — 80053 COMPREHEN METABOLIC PANEL: CPT

## 2022-11-01 PROCEDURE — 70450 CT HEAD/BRAIN W/O DYE: CPT

## 2022-11-01 PROCEDURE — 99284 EMERGENCY DEPT VISIT MOD MDM: CPT

## 2022-11-03 LAB
EKG ATRIAL RATE: 86 BPM
EKG P AXIS: 47 DEGREES
EKG P-R INTERVAL: 160 MS
EKG Q-T INTERVAL: 390 MS
EKG QRS DURATION: 86 MS
EKG QTC CALCULATION (BAZETT): 466 MS
EKG R AXIS: 88 DEGREES
EKG T AXIS: 69 DEGREES
EKG VENTRICULAR RATE: 86 BPM

## 2022-11-04 ASSESSMENT — ENCOUNTER SYMPTOMS
ABDOMINAL PAIN: 0
VOMITING: 0
NAUSEA: 0
SHORTNESS OF BREATH: 0
EYE REDNESS: 0

## 2022-11-04 NOTE — ED PROVIDER NOTES
Chief complaint: Tingling      HPI:  22, Time: 10:31 AM EDT    HPI               Leobardo Lopez is a 30 y.o. female presenting to the ED for. The history is obtained from the patient as well as the patient's medical record. The patient states that she started approximately 5 hours prior to arrival with tingling in her bilateral hands and feet. She denies any particular numbness or weakness. She states that she does have paresthesias. These are mild in severity. Not makes better. Nothing makes worse. No treatment prior to arrival.  She denies any headache, blurred vision. No numbness, weakness or paresthesias of the extremities. She denies any chest pain, shortness of breath, nausea, vomiting or abdominal pain. She denies any neck pain. ROS:   Review of Systems   Constitutional:  Negative for chills and fatigue. HENT:  Negative for congestion. Eyes:  Negative for redness. Respiratory:  Negative for shortness of breath. Cardiovascular:  Negative for chest pain. Gastrointestinal:  Negative for abdominal pain, nausea and vomiting. Genitourinary:  Negative for dysuria. Musculoskeletal:  Negative for arthralgias. Skin:  Negative for rash. Neurological:  Negative for light-headedness. Paresthesia   Psychiatric/Behavioral:  Negative for confusion. All other systems reviewed and are negative.    --------------------------------------------- PAST HISTORY ---------------------------------------------  Past Medical History:  has a past medical history of H/O chronic bronchitis, HTN (hypertension), Knee strain, right, subsequent encounter, Mental disorder, Migraine, Pregnancy, and Sciatica. Past Surgical History:  has a past surgical history that includes  section ();  section (); Cholecystectomy; Cholecystectomy, laparoscopic (N/A, 10-10-13); Tubal ligation; and hernia repair. Social History:  reports that she has been smoking.  She has a 2.50 pack-year smoking history. She has never used smokeless tobacco. She reports that she does not drink alcohol and does not use drugs. Family History: family history is not on file. The patients home medications have been reviewed. Allergies: Sulfa antibiotics    ---------------------------------------------------PHYSICAL EXAM--------------------------------------      Constitutional/General: Alert and oriented x3, well appearing, non toxic in NAD  Head: Normocephalic and atraumatic  Mouth: Oropharynx clear, handling secretions, no trismus  Neck: Supple, full ROM,  Pulmonary: Lungs clear to auscultation bilaterally, no wheezes, rales, or rhonchi. Not in respiratory distress  Cardiovascular:  Regular rate. Regular rhythm. No murmurs  Chest: no chest wall tenderness  Abdomen: Soft. Non tender. Non distended. No rebound, guarding, or rigidity. No pulsatile masses appreciated. Musculoskeletal: Moves all extremities x 4. Warm and well perfused, no clubbing, cyanosis, or edema. Capillary refill <3 seconds  Skin: warm and dry. No rashes. Neurologic: Cranial nerves II to XII grossly intact bilaterally, 5 out of 5 muscle strength of the bilateral upper and lower extremities  Psych: Normal Affect    -------------------------------------------------- RESULTS -------------------------------------------------  I have personally reviewed all laboratory and imaging results for this patient. Results are listed below.      LABS:  Results for orders placed or performed during the hospital encounter of 11/01/22   CBC with Auto Differential   Result Value Ref Range    WBC 8.7 4.5 - 11.5 E9/L    RBC 4.82 3.50 - 5.50 E12/L    Hemoglobin 14.2 11.5 - 15.5 g/dL    Hematocrit 42.6 34.0 - 48.0 %    MCV 88.4 80.0 - 99.9 fL    MCH 29.5 26.0 - 35.0 pg    MCHC 33.3 32.0 - 34.5 %    RDW 11.9 11.5 - 15.0 fL    Platelets 776 931 - 799 E9/L    MPV 10.4 7.0 - 12.0 fL    Neutrophils % 52.0 43.0 - 80.0 %    Immature Granulocytes % 0.2 0.0 - 5.0 % Lymphocytes % 38.6 20.0 - 42.0 %    Monocytes % 7.7 2.0 - 12.0 %    Eosinophils % 1.0 0.0 - 6.0 %    Basophils % 0.5 0.0 - 2.0 %    Neutrophils Absolute 4.49 1.80 - 7.30 E9/L    Immature Granulocytes # 0.02 E9/L    Lymphocytes Absolute 3.34 1.50 - 4.00 E9/L    Monocytes Absolute 0.67 0.10 - 0.95 E9/L    Eosinophils Absolute 0.09 0.05 - 0.50 E9/L    Basophils Absolute 0.04 0.00 - 0.20 E9/L   Comprehensive Metabolic Panel w/ Reflex to MG   Result Value Ref Range    Sodium 135 132 - 146 mmol/L    Potassium reflex Magnesium 3.5 3.5 - 5.0 mmol/L    Chloride 100 98 - 107 mmol/L    CO2 25 22 - 29 mmol/L    Anion Gap 10 7 - 16 mmol/L    Glucose 90 74 - 99 mg/dL    BUN 13 6 - 20 mg/dL    Creatinine 0.9 0.5 - 1.0 mg/dL    Est, Glom Filt Rate >60 >=60 mL/min/1.73    Calcium 9.3 8.6 - 10.2 mg/dL    Total Protein 7.0 6.4 - 8.3 g/dL    Albumin 4.1 3.5 - 5.2 g/dL    Total Bilirubin 0.5 0.0 - 1.2 mg/dL    Alkaline Phosphatase 90 35 - 104 U/L    ALT 13 0 - 32 U/L    AST 15 0 - 31 U/L   Magnesium   Result Value Ref Range    Magnesium 1.9 1.6 - 2.6 mg/dL   EKG 12 Lead   Result Value Ref Range    Ventricular Rate 86 BPM    Atrial Rate 86 BPM    P-R Interval 160 ms    QRS Duration 86 ms    Q-T Interval 390 ms    QTc Calculation (Bazett) 466 ms    P Axis 47 degrees    R Axis 88 degrees    T Axis 69 degrees       RADIOLOGY:  Interpreted by Radiologist.  CT HEAD WO CONTRAST   Final Result   No acute intracranial hemorrhage or mass effect. EKG:  This EKG is signed and interpreted by me. Normal sinus rhythm rate of 86, no ST segment elevation or depression, TN interval 160 MS, QRS 86 MS, QTc 466 MS  Interpreted by me      ------------------------- NURSING NOTES AND VITALS REVIEWED ---------------------------   The nursing notes within the ED encounter and vital signs as below have been reviewed by myself.   BP (!) 138/95   Pulse 82   Temp 98.1 °F (36.7 °C)   Resp 16   LMP 10/28/2022 (Exact Date)   SpO2 100%   Oxygen Saturation Interpretation: Normal    The patients available past medical records and past encounters were reviewed. ------------------------------ ED COURSE/MEDICAL DECISION MAKING----------------------  Medications - No data to display          Medical Decision Making:   I, Dr. Torrie Ponce am the primary physician of record. Carl Carr is a 30 y.o. female who presents to the ED for paresthesias in her bilateral hands and feet. Patient arrived with blood pressure of 138/95, heart rate of 82, respiratory rate of 16, pulse ox 100%, temperature 98.1. The patient is in no acute distress. Patient has a NIH stroke scale of 0. Differential diagnosis includes but is not limited to intracranial hemorrhage, electrolyte derangement, dehydration, peripheral neuropathy the patient does have a past medical history of   has a past medical history of H/O chronic bronchitis, HTN (hypertension), Knee strain, right, subsequent encounter, Mental disorder, Migraine, Pregnancy, and Sciatica. .  Labs and imaging obtained, reviewed. Patient treated symptomatically. Results discussed with patient. Patient did a CBC fairly unremarkable, CMP unremarkable, magnesium 1.9, CT head with no acute process. The patient with no focal neurologic deficits on examination. NIH stroke scale of 0. Patient will be discharged and follow-up outpatient. Re-Evaluations/Consultations:             ED Course as of 11/04/22 1031   Tue Nov 01, 2022   2155 Patient is in the bed no acute distress. Results of today were discussed. Patient be discharged. [MT]      ED Course User Index  [MT] Lyle Bethea,                This patient's ED course included: History, physical examination, reevaluation prior to disposition, labs, imaging, telemetry monitoring, EKG       This patient has remained hemodynamically stable during their ED course. Counseling:    The emergency provider has spoken with the patient and discussed todays results, in addition to providing specific details for the plan of care and counseling regarding the diagnosis and prognosis. Questions are answered at this time and they are agreeable with the plan.       --------------------------------- IMPRESSION AND DISPOSITION ---------------------------------    IMPRESSION  1. Paresthesia        DISPOSITION  Disposition: Discharge to home  Patient condition is stable        NOTE: This report was transcribed using voice recognition software.  Every effort was made to ensure accuracy; however, inadvertent computerized transcription errors may be present         Zina Drake DO  11/04/22 1036

## 2023-02-17 ENCOUNTER — HOSPITAL ENCOUNTER (EMERGENCY)
Age: 38
Discharge: HOME | End: 2023-02-17
Attending: EMERGENCY MEDICINE
Payer: COMMERCIAL

## 2023-02-17 VITALS
DIASTOLIC BLOOD PRESSURE: 62 MMHG | HEART RATE: 95 BPM | SYSTOLIC BLOOD PRESSURE: 114 MMHG | RESPIRATION RATE: 18 BRPM | TEMPERATURE: 98.2 F | OXYGEN SATURATION: 98 %

## 2023-02-17 DIAGNOSIS — T50.901A ACCIDENTAL OVERDOSE, INITIAL ENCOUNTER: Primary | ICD-10-CM

## 2023-02-17 PROCEDURE — 99284 EMERGENCY DEPT VISIT MOD MDM: CPT

## 2023-02-17 PROCEDURE — 2580000003 HC RX 258: Performed by: EMERGENCY MEDICINE

## 2023-02-17 RX ORDER — 0.9 % SODIUM CHLORIDE 0.9 %
1000 INTRAVENOUS SOLUTION INTRAVENOUS ONCE
Status: COMPLETED | OUTPATIENT
Start: 2023-02-17 | End: 2023-02-17

## 2023-02-17 RX ADMIN — SODIUM CHLORIDE 1000 ML: 9 INJECTION, SOLUTION INTRAVENOUS at 00:45

## 2023-02-17 ASSESSMENT — LIFESTYLE VARIABLES: HOW OFTEN DO YOU HAVE A DRINK CONTAINING ALCOHOL: NEVER

## 2023-02-17 ASSESSMENT — PAIN - FUNCTIONAL ASSESSMENT
PAIN_FUNCTIONAL_ASSESSMENT: NONE - DENIES PAIN
PAIN_FUNCTIONAL_ASSESSMENT: NONE - DENIES PAIN

## 2023-02-17 NOTE — ED PROVIDER NOTES
Department of Emergency Medicine   ED  Provider Note  Admit Date/RoomTime: 2023 12:32 AM  ED Room: 23  12:40 AM EST    History of Present Illness:   Toribio Martin is a 41 y.o. female presenting to the ED for accidental overdose, beginning prior to arrival.  The complaint has been constant, severe in severity, and worsened by nothing. Patient reports accidental overdose on an unknown substance. Patient states that she was just smoking a \"blunt. \"  She thought it was just marijuana. she was only trying to get high. she reports that she was only using the drug recreationally and was not trying to harm self. Completely denies any suicidal ideation. The patient did  receive narcan prior to arrival.  Patient was given 2 mg of intranasal Narcan with return to normal mentation. Review of Systems:   A complete review of systems was performed and pertinent positives and negatives are stated within HPI, all other systems reviewed and are negative.          --------------------------------------------- PAST HISTORY ---------------------------------------------  Past Medical History:  has a past medical history of H/O chronic bronchitis, HTN (hypertension), Knee strain, right, subsequent encounter, Mental disorder, Migraine, Pregnancy, and Sciatica. Past Surgical History:  has a past surgical history that includes  section ();  section (); Cholecystectomy; Cholecystectomy, laparoscopic (N/A, 10-10-13); Tubal ligation; and hernia repair. Social History:  reports that she has been smoking cigarettes. She has a 2.50 pack-year smoking history. She has never used smokeless tobacco. She reports current drug use. Drug: Marijuana [place]). She reports that she does not drink alcohol. Family History: family history is not on file. The patients home medications have been reviewed.     Allergies: Sulfa antibiotics    -------------------------------------------------- RESULTS -------------------------------------------------  All laboratory and radiology results have been personally reviewed by myself   LABS:  No results found for this visit on 02/17/23. RADIOLOGY:  Interpreted by Radiologist.  No orders to display       ------------------------- NURSING NOTES AND VITALS REVIEWED ---------------------------   The nursing notes within the ED encounter and vital signs as below have been reviewed. BP (!) 149/99   Pulse (!) 109   Temp 98.2 °F (36.8 °C) (Oral)   Resp 18   LMP 01/20/2023 (Exact Date)   SpO2 99%   Oxygen Saturation Interpretation: Normal      ---------------------------------------------------PHYSICAL EXAM--------------------------------------    Constitutional/General: Alert and oriented x3, well appearing, non toxic in NAD, patient is tearful and upset. Head: Normocephalic and atraumatic  Eyes: PERRL, EOMI, conjunctiva normal, sclera non icteric  Mouth: Oropharynx clear, handling secretions,   Neck: Supple, full ROM, non tender to palpation in the midline, no stridor, no crepitus,   Respiratory: Lungs clear to auscultation bilaterally, no wheezes, rales, or rhonchi. Not in respiratory distress  Cardiovascular: Tachycardic rate. Regular rhythm. No murmurs, gallops, or rubs. Chest: No chest wall tenderness  GI:  Abdomen Soft, Non tender,   Musculoskeletal: Moves all extremities x 4. Warm and well perfused, cyanosis, or edema. Capillary refill <3 seconds  Integument: skin warm and dry. No rashes.    Neurologic: GCS 15, no focal deficits, symmetric strength 5/5 in the upper and lower extremities bilaterally  Psychiatric: Normal Affect      ------------------------------ ED COURSE/MEDICAL DECISION MAKING----------------------  Medications   0.9 % sodium chloride bolus (1,000 mLs IntraVENous New Bag 2/17/23 0045)         Medical Decision Making:    Patient has been observed in the ED for over 2 hours. No recurrence of apnea or hypoxia. Patient is comfortably discharged home. We will call for a sober ride. Discussed with patient that the marijuana she may have smoked could have been laced with some type of opiate that could have caused her to be unresponsive. There is also the possibility that patient is not being truthful and also ingested, snorted, or injected an opiate. Counseling: The emergency provider has spoken with the patient and discussed todays results, in addition to providing specific details for the plan of care and counseling regarding the diagnosis and prognosis. Questions are answered at this time and they are agreeable with the plan.      --------------------------------- IMPRESSION AND DISPOSITION ---------------------------------    IMPRESSION  1. Accidental overdose, initial encounter        DISPOSITION  Disposition: Discharge to home a sober ride.   Patient condition is good                  Pepito Rosen DO  02/17/23 0245

## 2023-09-16 ENCOUNTER — HOSPITAL ENCOUNTER (EMERGENCY)
Age: 38
Discharge: PSYCHIATRIC OR SUBSTANCE USE FACILITY | End: 2023-09-17
Attending: STUDENT IN AN ORGANIZED HEALTH CARE EDUCATION/TRAINING PROGRAM | Admitting: PSYCHIATRY & NEUROLOGY
Payer: COMMERCIAL

## 2023-09-16 DIAGNOSIS — Z76.89 ENCOUNTER FOR PSYCHIATRIC ASSESSMENT: Primary | ICD-10-CM

## 2023-09-16 LAB
ALBUMIN SERPL-MCNC: 4.6 G/DL (ref 3.5–5.2)
ALP SERPL-CCNC: 108 U/L (ref 35–104)
ALT SERPL-CCNC: 38 U/L (ref 0–32)
ANION GAP SERPL CALCULATED.3IONS-SCNC: 19 MMOL/L (ref 7–16)
APAP SERPL-MCNC: <5 UG/ML (ref 10–30)
AST SERPL-CCNC: 31 U/L (ref 0–31)
BASOPHILS # BLD: 0.05 K/UL (ref 0–0.2)
BASOPHILS NFR BLD: 0 % (ref 0–2)
BILIRUB SERPL-MCNC: 0.6 MG/DL (ref 0–1.2)
BUN SERPL-MCNC: 27 MG/DL (ref 6–20)
CALCIUM SERPL-MCNC: 9.7 MG/DL (ref 8.6–10.2)
CHLORIDE SERPL-SCNC: 100 MMOL/L (ref 98–107)
CO2 SERPL-SCNC: 18 MMOL/L (ref 22–29)
CREAT SERPL-MCNC: 1 MG/DL (ref 0.5–1)
EOSINOPHIL # BLD: 0.01 K/UL (ref 0.05–0.5)
EOSINOPHILS RELATIVE PERCENT: 0 % (ref 0–6)
ERYTHROCYTE [DISTWIDTH] IN BLOOD BY AUTOMATED COUNT: 11.9 % (ref 11.5–15)
ETHANOLAMINE SERPL-MCNC: <10 MG/DL
GFR SERPL CREATININE-BSD FRML MDRD: >60 ML/MIN/1.73M2
GLUCOSE SERPL-MCNC: 142 MG/DL (ref 74–99)
HCG, URINE, POC: NEGATIVE
HCT VFR BLD AUTO: 44.9 % (ref 34–48)
HGB BLD-MCNC: 15.7 G/DL (ref 11.5–15.5)
IMM GRANULOCYTES # BLD AUTO: 0.03 K/UL (ref 0–0.58)
IMM GRANULOCYTES NFR BLD: 0 % (ref 0–5)
LYMPHOCYTES NFR BLD: 0.81 K/UL (ref 1.5–4)
LYMPHOCYTES RELATIVE PERCENT: 7 % (ref 20–42)
Lab: NORMAL
MCH RBC QN AUTO: 30.1 PG (ref 26–35)
MCHC RBC AUTO-ENTMCNC: 35 G/DL (ref 32–34.5)
MCV RBC AUTO: 86 FL (ref 80–99.9)
MONOCYTES NFR BLD: 0.46 K/UL (ref 0.1–0.95)
MONOCYTES NFR BLD: 4 % (ref 2–12)
NEGATIVE QC PASS/FAIL: NORMAL
NEUTROPHILS NFR BLD: 88 % (ref 43–80)
NEUTS SEG NFR BLD: 9.99 K/UL (ref 1.8–7.3)
PLATELET # BLD AUTO: 327 K/UL (ref 130–450)
PMV BLD AUTO: 10.7 FL (ref 7–12)
POSITIVE QC PASS/FAIL: NORMAL
POTASSIUM SERPL-SCNC: 3.7 MMOL/L (ref 3.5–5)
PROT SERPL-MCNC: 7.7 G/DL (ref 6.4–8.3)
RBC # BLD AUTO: 5.22 M/UL (ref 3.5–5.5)
SALICYLATES SERPL-MCNC: <0.3 MG/DL (ref 0–30)
SODIUM SERPL-SCNC: 137 MMOL/L (ref 132–146)
TOXIC TRICYCLIC SC,BLOOD: NEGATIVE
WBC OTHER # BLD: 11.4 K/UL (ref 4.5–11.5)

## 2023-09-16 PROCEDURE — G0480 DRUG TEST DEF 1-7 CLASSES: HCPCS

## 2023-09-16 PROCEDURE — 80307 DRUG TEST PRSMV CHEM ANLYZR: CPT

## 2023-09-16 PROCEDURE — 93005 ELECTROCARDIOGRAM TRACING: CPT

## 2023-09-16 PROCEDURE — 81001 URINALYSIS AUTO W/SCOPE: CPT

## 2023-09-16 PROCEDURE — 2580000003 HC RX 258

## 2023-09-16 PROCEDURE — 96361 HYDRATE IV INFUSION ADD-ON: CPT

## 2023-09-16 PROCEDURE — 80053 COMPREHEN METABOLIC PANEL: CPT

## 2023-09-16 PROCEDURE — 96360 HYDRATION IV INFUSION INIT: CPT

## 2023-09-16 PROCEDURE — 80143 DRUG ASSAY ACETAMINOPHEN: CPT

## 2023-09-16 PROCEDURE — 99284 EMERGENCY DEPT VISIT MOD MDM: CPT

## 2023-09-16 PROCEDURE — 85025 COMPLETE CBC W/AUTO DIFF WBC: CPT

## 2023-09-16 PROCEDURE — 82550 ASSAY OF CK (CPK): CPT

## 2023-09-16 PROCEDURE — 80179 DRUG ASSAY SALICYLATE: CPT

## 2023-09-16 RX ORDER — 0.9 % SODIUM CHLORIDE 0.9 %
1000 INTRAVENOUS SOLUTION INTRAVENOUS ONCE
Status: COMPLETED | OUTPATIENT
Start: 2023-09-16 | End: 2023-09-17

## 2023-09-16 RX ADMIN — SODIUM CHLORIDE 1000 ML: 9 INJECTION, SOLUTION INTRAVENOUS at 19:56

## 2023-09-16 NOTE — ED NOTES
Received report from [facility]; assumed care of pt at this time; patient is police hold; constant observer at bedside; patient aware of need for urine specimen; will monitor     Robina Ramirez RN  09/16/23 1947

## 2023-09-16 NOTE — ED NOTES
CYNTHIA NOTIFIED SPOKE [address] [facility],Fourth [address] FAXED      Parrish Banks  09/16/23 0048

## 2023-09-17 ENCOUNTER — HOSPITAL ENCOUNTER (INPATIENT)
Age: 38
LOS: 9 days | Discharge: HOME | DRG: 753 | End: 2023-09-26
Attending: PSYCHIATRY & NEUROLOGY | Admitting: PSYCHIATRY & NEUROLOGY
Payer: COMMERCIAL

## 2023-09-17 VITALS
HEART RATE: 88 BPM | RESPIRATION RATE: 16 BRPM | BODY MASS INDEX: 30.05 KG/M2 | WEIGHT: 176 LBS | OXYGEN SATURATION: 95 % | DIASTOLIC BLOOD PRESSURE: 83 MMHG | TEMPERATURE: 99 F | SYSTOLIC BLOOD PRESSURE: 122 MMHG | HEIGHT: 64 IN

## 2023-09-17 DIAGNOSIS — F31.60 BIPOLAR AFFECTIVE DISORDER, MIXED (HCC): Primary | ICD-10-CM

## 2023-09-17 PROBLEM — F23 ACUTE PSYCHOSIS (HCC): Status: ACTIVE | Noted: 2023-09-17

## 2023-09-17 LAB
AMPHET UR QL SCN: POSITIVE
BARBITURATES UR QL SCN: NEGATIVE
BENZODIAZ UR QL: NEGATIVE
BILIRUB UR QL STRIP: ABNORMAL
BUPRENORPHINE UR QL: NEGATIVE
CANNABINOIDS UR QL SCN: POSITIVE
CASTS #/AREA URNS LPF: ABNORMAL /LPF
CK SERPL-CCNC: 61 U/L (ref 20–180)
CLARITY UR: ABNORMAL
COCAINE UR QL SCN: NEGATIVE
COLOR UR: YELLOW
EKG ATRIAL RATE: 127 BPM
EKG P AXIS: 68 DEGREES
EKG P-R INTERVAL: 144 MS
EKG Q-T INTERVAL: 314 MS
EKG QRS DURATION: 82 MS
EKG QTC CALCULATION (BAZETT): 456 MS
EKG R AXIS: 85 DEGREES
EKG T AXIS: 37 DEGREES
EKG VENTRICULAR RATE: 127 BPM
EPI CELLS #/AREA URNS HPF: ABNORMAL /HPF
FENTANYL UR QL: NEGATIVE
GLUCOSE UR STRIP-MCNC: NEGATIVE MG/DL
HGB UR QL STRIP.AUTO: ABNORMAL
KETONES UR STRIP-MCNC: >80 MG/DL
LEUKOCYTE ESTERASE UR QL STRIP: NEGATIVE
METHADONE UR QL: NEGATIVE
NITRITE UR QL STRIP: NEGATIVE
OPIATES UR QL SCN: NEGATIVE
OXYCODONE UR QL SCN: NEGATIVE
PCP UR QL SCN: NEGATIVE
PH UR STRIP: 6 [PH] (ref 5–9)
PROT UR STRIP-MCNC: 100 MG/DL
RBC #/AREA URNS HPF: ABNORMAL /HPF
SP GR UR STRIP: >1.03 (ref 1–1.03)
TEST INFORMATION: ABNORMAL
UROBILINOGEN UR STRIP-ACNC: 0.2 EU/DL (ref 0–1)
WBC #/AREA URNS HPF: ABNORMAL /HPF

## 2023-09-17 PROCEDURE — 6370000000 HC RX 637 (ALT 250 FOR IP): Performed by: PSYCHIATRY & NEUROLOGY

## 2023-09-17 PROCEDURE — 1240000000 HC EMOTIONAL WELLNESS R&B

## 2023-09-17 PROCEDURE — 93010 ELECTROCARDIOGRAM REPORT: CPT | Performed by: INTERNAL MEDICINE

## 2023-09-17 PROCEDURE — 6370000000 HC RX 637 (ALT 250 FOR IP): Performed by: NURSE PRACTITIONER

## 2023-09-17 RX ORDER — MAGNESIUM HYDROXIDE/ALUMINUM HYDROXICE/SIMETHICONE 120; 1200; 1200 MG/30ML; MG/30ML; MG/30ML
30 SUSPENSION ORAL PRN
Status: DISCONTINUED | OUTPATIENT
Start: 2023-09-17 | End: 2023-09-26 | Stop reason: HOSPADM

## 2023-09-17 RX ORDER — MAGNESIUM HYDROXIDE/ALUMINUM HYDROXICE/SIMETHICONE 120; 1200; 1200 MG/30ML; MG/30ML; MG/30ML
30 SUSPENSION ORAL PRN
Status: DISCONTINUED | OUTPATIENT
Start: 2023-09-17 | End: 2023-09-17 | Stop reason: HOSPADM

## 2023-09-17 RX ORDER — HALOPERIDOL 5 MG/ML
5 INJECTION INTRAMUSCULAR EVERY 6 HOURS PRN
Status: DISCONTINUED | OUTPATIENT
Start: 2023-09-17 | End: 2023-09-26 | Stop reason: HOSPADM

## 2023-09-17 RX ORDER — NICOTINE 21 MG/24HR
1 PATCH, TRANSDERMAL 24 HOURS TRANSDERMAL DAILY
Status: DISCONTINUED | OUTPATIENT
Start: 2023-09-17 | End: 2023-09-17 | Stop reason: HOSPADM

## 2023-09-17 RX ORDER — ACETAMINOPHEN 325 MG/1
650 TABLET ORAL EVERY 6 HOURS PRN
Status: DISCONTINUED | OUTPATIENT
Start: 2023-09-17 | End: 2023-09-26 | Stop reason: HOSPADM

## 2023-09-17 RX ORDER — HALOPERIDOL 5 MG/ML
5 INJECTION INTRAMUSCULAR EVERY 6 HOURS PRN
Status: DISCONTINUED | OUTPATIENT
Start: 2023-09-17 | End: 2023-09-17 | Stop reason: HOSPADM

## 2023-09-17 RX ORDER — HALOPERIDOL 5 MG/1
5 TABLET ORAL EVERY 6 HOURS PRN
Status: DISCONTINUED | OUTPATIENT
Start: 2023-09-17 | End: 2023-09-17 | Stop reason: HOSPADM

## 2023-09-17 RX ORDER — LANOLIN ALCOHOL/MO/W.PET/CERES
3 CREAM (GRAM) TOPICAL NIGHTLY PRN
Status: DISCONTINUED | OUTPATIENT
Start: 2023-09-17 | End: 2023-09-26 | Stop reason: HOSPADM

## 2023-09-17 RX ORDER — NICOTINE 21 MG/24HR
1 PATCH, TRANSDERMAL 24 HOURS TRANSDERMAL DAILY
Status: DISCONTINUED | OUTPATIENT
Start: 2023-09-17 | End: 2023-09-26 | Stop reason: HOSPADM

## 2023-09-17 RX ORDER — ACETAMINOPHEN 325 MG/1
650 TABLET ORAL EVERY 6 HOURS PRN
Status: DISCONTINUED | OUTPATIENT
Start: 2023-09-17 | End: 2023-09-17 | Stop reason: HOSPADM

## 2023-09-17 RX ORDER — HYDROXYZINE PAMOATE 50 MG/1
50 CAPSULE ORAL 3 TIMES DAILY PRN
Status: DISCONTINUED | OUTPATIENT
Start: 2023-09-17 | End: 2023-09-26 | Stop reason: HOSPADM

## 2023-09-17 RX ORDER — HALOPERIDOL 5 MG/1
5 TABLET ORAL EVERY 6 HOURS PRN
Status: DISCONTINUED | OUTPATIENT
Start: 2023-09-17 | End: 2023-09-26 | Stop reason: HOSPADM

## 2023-09-17 RX ADMIN — MELATONIN 3 MG ORAL TABLET 3 MG: 3 TABLET ORAL at 21:25

## 2023-09-17 RX ADMIN — HYDROXYZINE PAMOATE 50 MG: 50 CAPSULE ORAL at 19:30

## 2023-09-17 ASSESSMENT — SLEEP AND FATIGUE QUESTIONNAIRES
SLEEP PATTERN: DISTURBED/INTERRUPTED SLEEP
DO YOU HAVE DIFFICULTY SLEEPING: YES
DO YOU USE A SLEEP AID: NO
AVERAGE NUMBER OF SLEEP HOURS: 3.5

## 2023-09-17 ASSESSMENT — PAIN - FUNCTIONAL ASSESSMENT: PAIN_FUNCTIONAL_ASSESSMENT: NONE - DENIES PAIN

## 2023-09-17 ASSESSMENT — PATIENT HEALTH QUESTIONNAIRE - PHQ9
8. MOVING OR SPEAKING SO SLOWLY THAT OTHER PEOPLE COULD HAVE NOTICED. OR THE OPPOSITE, BEING SO FIGETY OR RESTLESS THAT YOU HAVE BEEN MOVING AROUND A LOT MORE THAN USUAL: 0
7. TROUBLE CONCENTRATING ON THINGS, SUCH AS READING THE NEWSPAPER OR WATCHING TELEVISION: 0
3. TROUBLE FALLING OR STAYING ASLEEP: 3
SUM OF ALL RESPONSES TO PHQ QUESTIONS 1-9: 16
1. LITTLE INTEREST OR PLEASURE IN DOING THINGS: 3
2. FEELING DOWN, DEPRESSED OR HOPELESS: 2
5. POOR APPETITE OR OVEREATING: 2
SUM OF ALL RESPONSES TO PHQ9 QUESTIONS 1 & 2: 5
4. FEELING TIRED OR HAVING LITTLE ENERGY: 3
SUM OF ALL RESPONSES TO PHQ QUESTIONS 1-9: 16
SUM OF ALL RESPONSES TO PHQ QUESTIONS 1-9: 16
6. FEELING BAD ABOUT YOURSELF - OR THAT YOU ARE A FAILURE OR HAVE LET YOURSELF OR YOUR FAMILY DOWN: 3
10. IF YOU CHECKED OFF ANY PROBLEMS, HOW DIFFICULT HAVE THESE PROBLEMS MADE IT FOR YOU TO DO YOUR WORK, TAKE CARE OF THINGS AT HOME, OR GET ALONG WITH OTHER PEOPLE: 2
9. THOUGHTS THAT YOU WOULD BE BETTER OFF DEAD, OR OF HURTING YOURSELF: 0
SUM OF ALL RESPONSES TO PHQ QUESTIONS 1-9: 16

## 2023-09-17 ASSESSMENT — LIFESTYLE VARIABLES
HOW MANY STANDARD DRINKS CONTAINING ALCOHOL DO YOU HAVE ON A TYPICAL DAY: PATIENT DOES NOT DRINK
HOW OFTEN DO YOU HAVE A DRINK CONTAINING ALCOHOL: NEVER

## 2023-09-17 NOTE — ED NOTES
Patient Jeancarlos Wilson following interview with Social Work. Patient changed into paper gown at this time. Patient belongings (shirt, pants, shoes, keys, and phone) placed in LOCKER #2. Patient is to remain on the telemetry monitor. Ligature risks removed from room.       Maryellen Perla RN  09/16/23 2822

## 2023-09-17 NOTE — ED NOTES
Constant observer at bedside; patient resting in bed; will monitor     Brant Alfredo RN  09/17/23 5726

## 2023-09-17 NOTE — ED NOTES
Pt attempted to provide a urine sample. Pt was in restroom for 8 min trying and was unsuccessful.  Pt connected back to fluids and will try again     Mei Miranda RN  09/16/23 4175

## 2023-09-17 NOTE — ED NOTES
Constant observer at bedside;  on Ipad having tele consult with patient at this time     Mojgan Rodrigues, [address]  09/17/23 6927

## 2023-09-17 NOTE — ED NOTES
Constant observer at bedside; patient resting in bed; eyes closed; needs met; will monitor     Jamaal Horan RN  09/17/23 4985

## 2023-09-17 NOTE — ED NOTES
Constant observer at bedside patient resting eyes closed will monitor     Rachelle Chaudhry RN  09/17/23 4914

## 2023-09-17 NOTE — ED NOTES
Placed on cardiac monitor bp cycling constant observer at bedside patient alert calm cooperative at this time      Yanira Sanches RN  09/16/23 2034

## 2023-09-17 NOTE — ED NOTES
Constant observer at bedside; patient resting; needs met; will monitor     Anastasia Little RN  09/17/23 8621

## 2023-09-17 NOTE — ED NOTES
Patient resting in bed; constant observer at bedside; will monitor     Magdiel Marquez RN  09/17/23 5910

## 2023-09-17 NOTE — ED NOTES
Behavioral Health Crisis Assessment      Chief Complaint: The pt was brought in by EMS after she called 911 because she thought people were in her house trying to \"beat her up\". Mental Status Exam: The pt presents anxious with a flat affect, and circumstantial speech. She is oriented times 3 and is a poor historian. She appears to be preoccupied with fear and is having trouble concentrating on questions. She has poor judgement and insight. She denied hallucinations and SI. She reported that she would try to kill someone who would try to come into her home. Legal Status:  [] Voluntary:  [x] Involuntary, Issued by:  ED doc    Gender:  [] Male [x] Female [] Transgender  [] Other    Sexual Orientation:  [x] Heterosexual [] Homosexual [] Bisexual [] Other    Brief Clinical Summary:  The pt was brought in by EMS after she called 911 when she believed there were people in her house trying to \"jump\" her. She reported a couple weeks ago she cheated on her bf b/c he cheated on her. She stated that her bf went through her phone and found out she cheated. She stated that earlier today her bf whispered in her ear that he was coming home with a bunch of people who were going to beat her ass. She stated that the people are affiliated with both her bf and the meena she cheated with. She stated that while she was in the shower she saw the people come up her stairs and she called 911 and they came. She stated that she did smoke some weed that was laced with meth. She denied that this could have anything to do with her current thought process. When asked if she was homicidal - she stated that if someone comes in her house she will do what it takes to protect herself. She denied a hx of SI - however according to the pt's chart she was admitted in 2014 after an OD on pills. Once medically cleared the pt will be presented for admission to psych.       Collateral Information: None    Risk Factors:  Hx of

## 2023-09-18 PROBLEM — F19.959 SUBSTANCE-INDUCED PSYCHOTIC DISORDER (HCC): Status: ACTIVE | Noted: 2023-09-18

## 2023-09-18 PROBLEM — F23 ACUTE PSYCHOSIS (HCC): Status: RESOLVED | Noted: 2023-09-17 | Resolved: 2023-09-18

## 2023-09-18 PROBLEM — F31.60 BIPOLAR AFFECTIVE DISORDER, MIXED (HCC): Status: ACTIVE | Noted: 2023-09-18

## 2023-09-18 PROBLEM — F19.10 POLYSUBSTANCE ABUSE (HCC): Status: ACTIVE | Noted: 2023-09-18

## 2023-09-18 LAB
CHOLEST SERPL-MCNC: 135 MG/DL
HBA1C MFR BLD: 5.5 % (ref 4–5.6)
HDLC SERPL-MCNC: 38 MG/DL
LDLC SERPL CALC-MCNC: 85 MG/DL
TRIGL SERPL-MCNC: 59 MG/DL
VLDLC SERPL CALC-MCNC: 12 MG/DL

## 2023-09-18 PROCEDURE — 83036 HEMOGLOBIN GLYCOSYLATED A1C: CPT

## 2023-09-18 PROCEDURE — 6370000000 HC RX 637 (ALT 250 FOR IP): Performed by: PSYCHIATRY & NEUROLOGY

## 2023-09-18 PROCEDURE — 6370000000 HC RX 637 (ALT 250 FOR IP): Performed by: NURSE PRACTITIONER

## 2023-09-18 PROCEDURE — 1240000000 HC EMOTIONAL WELLNESS R&B

## 2023-09-18 PROCEDURE — 36415 COLL VENOUS BLD VENIPUNCTURE: CPT

## 2023-09-18 PROCEDURE — 80061 LIPID PANEL: CPT

## 2023-09-18 PROCEDURE — 90792 PSYCH DIAG EVAL W/MED SRVCS: CPT | Performed by: NURSE PRACTITIONER

## 2023-09-18 RX ORDER — OLANZAPINE 5 MG/1
5 TABLET ORAL NIGHTLY
Status: DISCONTINUED | OUTPATIENT
Start: 2023-09-18 | End: 2023-09-20

## 2023-09-18 RX ORDER — DIVALPROEX SODIUM 250 MG/1
250 TABLET, DELAYED RELEASE ORAL EVERY 12 HOURS SCHEDULED
Status: DISCONTINUED | OUTPATIENT
Start: 2023-09-18 | End: 2023-09-20

## 2023-09-18 RX ADMIN — MELATONIN 3 MG ORAL TABLET 3 MG: 3 TABLET ORAL at 21:23

## 2023-09-18 RX ADMIN — HYDROXYZINE PAMOATE 50 MG: 50 CAPSULE ORAL at 21:23

## 2023-09-18 RX ADMIN — OLANZAPINE 5 MG: 5 TABLET, FILM COATED ORAL at 21:23

## 2023-09-18 RX ADMIN — HYDROXYZINE PAMOATE 50 MG: 50 CAPSULE ORAL at 12:42

## 2023-09-18 RX ADMIN — DIVALPROEX SODIUM 250 MG: 250 TABLET, DELAYED RELEASE ORAL at 21:23

## 2023-09-18 NOTE — H&P
Department of Psychiatry  History and Physical - Adult       Patient personally seen and examined by me mental status examination performed. I agree the below assessment by the medical student. Psychomotor evaluation revealed no agitation retardation any abnormal movements. Her eye contact is fair her speech is normal rate rhythm and tone. Her mood is \"I am fine. \"  Affect is mood incongruent irritable easily agitated thought process is linear without flight of ideas loose associations. Thought contents devoid of any auditory or visual hallucinations delusions or other perceptual normalities. she denies suicidal homicidal ideations intent or plan. She is able to repeat words and phrases, her vocabulary is intact she has knowledge of current events and past events recent remote memory are intact her impulse control is adequate her cognitive function was to be at her baseline her insight judgment is fair she is alert oriented time place and person            CHIEF COMPLAINT:      \" I was seeing and hearing people in my house\"    CIRCUMSTANCES OF ADMISSION:   Patient was brought by the police for a psych evaluation due to patient seeing and hearing people in her house. HISTORY OF PRESENT ILLNESS:      The patient is a 41 y.o. female, never  and 3 kids, with a significant history of anxiety, depression, and prior psychiatric hospitalization. Patient was brought to the ED by the police because she believes there is people out there trying to hurt her. Urine drug screen was positive for cannabis and methamphetamines. QTC was 456 ms. Patient was medically cleared and admitted to [facility] adult psychiatric unit for further psychiatric evaluation and stabilization. Upon evaluation today, patient understands being brought by the police due to her seeing and hearing people in her house trying to hurt her.  She states that she is not crazy and her ex-boyfriend and his family are constantly trying to get in and hurt

## 2023-09-18 NOTE — GROUP NOTE
Group Therapy Note    Date: 9/18/2023    Group Start Time: 1010  Group End Time: 0547  Group Topic: Psychoeducation    [facility] [address], [place]                                                                        Group Therapy Note    Date: 9/18/2023  Module Name:  goals and change     Patient's Goal:  patient will be able to identify 2 steps one can take to finding balance. Notes:  pleasant and sharing in group. Able to participate when prompted. Accepting of handout. Status After Intervention:  Improved    Participation Level:  Active Listener and Interactive    Participation Quality: Appropriate, Attentive, and Sharing      Speech:  normal      Thought Process/Content: Logical      Affective Functioning: Congruent      Mood: euthymic      Level of consciousness:  Alert, Oriented x4, and Attentive      Response to Learning: Able to verbalize/acknowledge new learning, Able to retain information, and Progressing to goal      Endings: None Reported    Modes of Intervention: Education, Support, Socialization, and Problem-solving      Discipline Responsible: Psychoeducational Specialist      Signature:  Hellen Kenney

## 2023-09-18 NOTE — GROUP NOTE
Group Therapy Note    Date: 9/18/2023    Group Start Time: 1520  Group End Time: 1600  Group Topic: Recreational    [facility] [address], [place]                                                                        Group Therapy Note    Date: 9/18/2023  Module Name:  family gerald      Patient's Goal: To increase socialization with other patients. Be able to display critical thinking skills and participate in turn taking. Notes:  Pleasant and sharing in group, able to participate appropriately with others. Status After Intervention:  Improved    Participation Level:  Active Listener and Interactive    Participation Quality: Appropriate, Attentive, and Sharing      Speech:  normal      Thought Process/Content: Logical      Affective Functioning: Congruent      Mood: euthymic      Level of consciousness:  Alert, Oriented x4, and Attentive      Response to Learning: Able to verbalize/acknowledge new learning, Able to retain information, and Progressing to goal      Endings: None Reported    Modes of Intervention: Education, Support, Socialization, Exploration, and Problem-solving      Discipline Responsible: Psychoeducational Specialist      Signature:  Shirlene Ortiz

## 2023-09-19 PROCEDURE — 6370000000 HC RX 637 (ALT 250 FOR IP): Performed by: NURSE PRACTITIONER

## 2023-09-19 PROCEDURE — 1240000000 HC EMOTIONAL WELLNESS R&B

## 2023-09-19 PROCEDURE — 99232 SBSQ HOSP IP/OBS MODERATE 35: CPT | Performed by: NURSE PRACTITIONER

## 2023-09-19 PROCEDURE — 6370000000 HC RX 637 (ALT 250 FOR IP): Performed by: PSYCHIATRY & NEUROLOGY

## 2023-09-19 RX ADMIN — HYDROXYZINE PAMOATE 50 MG: 50 CAPSULE ORAL at 18:44

## 2023-09-19 RX ADMIN — OLANZAPINE 5 MG: 5 TABLET, FILM COATED ORAL at 20:35

## 2023-09-19 RX ADMIN — DIVALPROEX SODIUM 250 MG: 250 TABLET, DELAYED RELEASE ORAL at 20:35

## 2023-09-19 RX ADMIN — MELATONIN 3 MG ORAL TABLET 3 MG: 3 TABLET ORAL at 20:35

## 2023-09-19 RX ADMIN — HYDROXYZINE PAMOATE 50 MG: 50 CAPSULE ORAL at 08:58

## 2023-09-19 RX ADMIN — DIVALPROEX SODIUM 250 MG: 250 TABLET, DELAYED RELEASE ORAL at 08:58

## 2023-09-19 RX ADMIN — ACETAMINOPHEN 650 MG: 325 TABLET ORAL at 17:32

## 2023-09-19 ASSESSMENT — PAIN DESCRIPTION - DESCRIPTORS: DESCRIPTORS: THROBBING

## 2023-09-19 ASSESSMENT — PAIN SCALES - GENERAL: PAINLEVEL_OUTOF10: 6

## 2023-09-19 ASSESSMENT — PAIN DESCRIPTION - LOCATION: LOCATION: HEAD

## 2023-09-19 NOTE — GROUP NOTE
Group Therapy Note    Date: 9/19/2023    Group Start Time: 5153  Group End Time: 5981  Group Topic: Psychoeducation    [facility]    Katie Araujo                                                                        Group Therapy Note    Date: 9/19/2023  Start Time: 2893  End Time:  5473  Number of Participants: 12    Type of Group: Psychoeducation    Wellness Binder Information  Module Name:  Harlan Busing on Let go/ Toxic Traits     Patient's Goal:  ID values, morals, and/or relationships one wants to let go of and hold on to. ID one or two ways toxic traits can negatively impact one's personal relationships or well being    Notes:  CTRS discussed different toxic traits to avoid in other's and sometimes in one's self. CTRS then provided patient with an activity of self-reflection \"Hold on Let go\" Patient engaged in discussion and activity and was receptive and accepting of handouts. Status After Intervention:  Improved    Participation Level:  Active Listener and Interactive    Participation Quality: Appropriate, Attentive, and Sharing      Speech:  normal      Thought Process/Content: Logical      Affective Functioning: Congruent      Mood: euthymic      Level of consciousness:  Alert and Attentive      Response to Learning: Able to verbalize current knowledge/experience, Able to verbalize/acknowledge new learning, and Able to retain information      Endings: None Reported    Modes of Intervention: Education, Exploration, and Clarifying      Discipline Responsible: Psychoeducational Specialist      Signature:  Katie Araujo

## 2023-09-19 NOTE — GROUP NOTE
Group Therapy Note    Date: 9/19/2023    Group Start Time: 5293  Group End Time: 1800  Group Topic: Recreational    [facility] 2    Jose Reddy                                                                        Group Therapy Note    Date: 9/19/2023  Start Time: 36  End Time:  1800  Number of Participants: 8    Type of Group: Recreational    Wellness Binder Information  Module Name:  Cinema therapy    Patient's Goal:  To id potential new leisure interest.  Improve mood and well being through the use of distraction coping technique    Notes:  Patient engaged in cinema therapy, and maintained attentive behavior throughout movie. Status After Intervention:  Improved    Participation Level:  Active Listener    Participation Quality: Appropriate and Attentive      Speech:  normal      Thought Process/Content: Logical      Affective Functioning: Congruent      Mood: euthymic      Level of consciousness:  Alert and Attentive      Response to Learning: Able to verbalize current knowledge/experience and Able to verbalize/acknowledge new learning      Endings: None Reported    Modes of Intervention: Exploration and Activity      Discipline Responsible: Psychoeducational Specialist      Signature:  OPAL Reddy     Group Therapy Note    Attendees: 8

## 2023-09-19 NOTE — GROUP NOTE
Group Therapy Note    Date: 9/19/2023    Group Start Time: 1100  Group End Time: 1140  Group Topic: Psychotherapy    [facility]  [address], [place]        Group Therapy Note    Attendees: 7       Patient's Goal:  To increase socialization and improve interpersonal relationships. Notes:  Pt was an active participant in group discussion. Status After Intervention:  Improved    Participation Level: Active Listener and Interactive    Participation Quality: Appropriate, Attentive, Sharing, and Supportive      Speech:  normal      Thought Process/Content: Logical  Linear      Affective Functioning: Congruent      Mood: anxious and depressed      Level of consciousness:  Alert, Oriented x4, and Attentive      Response to Learning: Able to verbalize current knowledge/experience, Able to verbalize/acknowledge new learning, Able to retain information, Capable of insight, and Progressing to goal      Endings: None Reported    Modes of Intervention: Support, Socialization, and Exploration      Discipline Responsible: /Counselor      Signature:   CLAUDIA Chacon, [facility]

## 2023-09-20 PROCEDURE — 6370000000 HC RX 637 (ALT 250 FOR IP): Performed by: NURSE PRACTITIONER

## 2023-09-20 PROCEDURE — 6370000000 HC RX 637 (ALT 250 FOR IP): Performed by: PSYCHIATRY & NEUROLOGY

## 2023-09-20 PROCEDURE — 99232 SBSQ HOSP IP/OBS MODERATE 35: CPT | Performed by: NURSE PRACTITIONER

## 2023-09-20 PROCEDURE — 1240000000 HC EMOTIONAL WELLNESS R&B

## 2023-09-20 RX ORDER — OLANZAPINE 5 MG/1
5 TABLET ORAL NIGHTLY
Qty: 30 TABLET | Refills: 0 | Status: SHIPPED | OUTPATIENT
Start: 2023-09-20 | End: 2023-09-26 | Stop reason: HOSPADM

## 2023-09-20 RX ORDER — DIVALPROEX SODIUM 250 MG/1
250 TABLET, DELAYED RELEASE ORAL EVERY 12 HOURS SCHEDULED
Qty: 60 TABLET | Refills: 0 | Status: SHIPPED | OUTPATIENT
Start: 2023-09-20 | End: 2023-09-26 | Stop reason: HOSPADM

## 2023-09-20 RX ORDER — NICOTINE 21 MG/24HR
1 PATCH, TRANSDERMAL 24 HOURS TRANSDERMAL DAILY
Qty: 30 PATCH | Refills: 0 | Status: ON HOLD
Start: 2023-09-21 | End: 2023-10-01

## 2023-09-20 RX ORDER — OLANZAPINE 10 MG/1
10 TABLET ORAL NIGHTLY
Status: DISCONTINUED | OUTPATIENT
Start: 2023-09-20 | End: 2023-09-22

## 2023-09-20 RX ORDER — DIVALPROEX SODIUM 500 MG/1
500 TABLET, DELAYED RELEASE ORAL EVERY 12 HOURS SCHEDULED
Status: DISCONTINUED | OUTPATIENT
Start: 2023-09-20 | End: 2023-09-26 | Stop reason: HOSPADM

## 2023-09-20 RX ADMIN — HYDROXYZINE PAMOATE 50 MG: 50 CAPSULE ORAL at 20:48

## 2023-09-20 RX ADMIN — HYDROXYZINE PAMOATE 50 MG: 50 CAPSULE ORAL at 08:55

## 2023-09-20 RX ADMIN — OLANZAPINE 10 MG: 10 TABLET, FILM COATED ORAL at 20:48

## 2023-09-20 RX ADMIN — DIVALPROEX SODIUM 250 MG: 250 TABLET, DELAYED RELEASE ORAL at 08:54

## 2023-09-20 RX ADMIN — DIVALPROEX SODIUM 500 MG: 500 TABLET, DELAYED RELEASE ORAL at 20:48

## 2023-09-20 RX ADMIN — MELATONIN 3 MG ORAL TABLET 3 MG: 3 TABLET ORAL at 20:48

## 2023-09-21 PROCEDURE — 6370000000 HC RX 637 (ALT 250 FOR IP): Performed by: NURSE PRACTITIONER

## 2023-09-21 PROCEDURE — 6370000000 HC RX 637 (ALT 250 FOR IP): Performed by: PSYCHIATRY & NEUROLOGY

## 2023-09-21 PROCEDURE — 1240000000 HC EMOTIONAL WELLNESS R&B

## 2023-09-21 PROCEDURE — 99232 SBSQ HOSP IP/OBS MODERATE 35: CPT | Performed by: NURSE PRACTITIONER

## 2023-09-21 RX ORDER — OLANZAPINE 2.5 MG/1
2.5 TABLET ORAL DAILY
Status: DISCONTINUED | OUTPATIENT
Start: 2023-09-21 | End: 2023-09-22

## 2023-09-21 RX ADMIN — MELATONIN 3 MG ORAL TABLET 3 MG: 3 TABLET ORAL at 20:41

## 2023-09-21 RX ADMIN — HYDROXYZINE PAMOATE 50 MG: 50 CAPSULE ORAL at 17:34

## 2023-09-21 RX ADMIN — OLANZAPINE 10 MG: 10 TABLET, FILM COATED ORAL at 20:40

## 2023-09-21 RX ADMIN — DIVALPROEX SODIUM 500 MG: 500 TABLET, DELAYED RELEASE ORAL at 08:42

## 2023-09-21 RX ADMIN — OLANZAPINE 2.5 MG: 2.5 TABLET, FILM COATED ORAL at 11:02

## 2023-09-21 RX ADMIN — MAGNESIUM HYDROXIDE 30 ML: 1200 LIQUID ORAL at 09:00

## 2023-09-21 RX ADMIN — DIVALPROEX SODIUM 500 MG: 500 TABLET, DELAYED RELEASE ORAL at 20:40

## 2023-09-21 RX ADMIN — HYDROXYZINE PAMOATE 50 MG: 50 CAPSULE ORAL at 08:42

## 2023-09-21 ASSESSMENT — PAIN SCALES - GENERAL
PAINLEVEL_OUTOF10: 0
PAINLEVEL_OUTOF10: 0

## 2023-09-21 ASSESSMENT — PAIN - FUNCTIONAL ASSESSMENT: PAIN_FUNCTIONAL_ASSESSMENT: ACTIVITIES ARE NOT PREVENTED

## 2023-09-21 NOTE — GROUP NOTE
Group Therapy Note    Date: 9/21/2023    Group Start Time: 1010  Group End Time: 3055  Group Topic: Psychoeducation    [facility] [address]                                                                        Group Therapy Note    Date: 9/21/2023  Module Name:  id of stressors   Patient's Goal:  patient will be able to id daily/life events that patient is currently struggling with. Notes:  Pleasant and sharing in group, willing to share once prompted. Status After Intervention:  Improved    Participation Level:  Active Listener and Interactive    Participation Quality: Appropriate, Attentive, and Sharing      Speech:  normal      Thought Process/Content: Logical      Affective Functioning: Congruent      Mood: euthymic      Level of consciousness:  Alert, Oriented x4, and Attentive      Response to Learning: Able to verbalize/acknowledge new learning, Able to retain information, and Progressing to goal      Endings: None Reported    Modes of Intervention: Education, Support, Socialization, Exploration, and Problem-solving      Discipline Responsible: Psychoeducational Specialist      Signature:  Pato Walker

## 2023-09-22 PROCEDURE — 99232 SBSQ HOSP IP/OBS MODERATE 35: CPT | Performed by: NURSE PRACTITIONER

## 2023-09-22 PROCEDURE — 6370000000 HC RX 637 (ALT 250 FOR IP): Performed by: NURSE PRACTITIONER

## 2023-09-22 PROCEDURE — 6370000000 HC RX 637 (ALT 250 FOR IP): Performed by: PSYCHIATRY & NEUROLOGY

## 2023-09-22 PROCEDURE — 1240000000 HC EMOTIONAL WELLNESS R&B

## 2023-09-22 RX ORDER — OLANZAPINE 5 MG/1
5 TABLET ORAL DAILY
Status: DISCONTINUED | OUTPATIENT
Start: 2023-09-23 | End: 2023-09-24

## 2023-09-22 RX ADMIN — MELATONIN 3 MG ORAL TABLET 3 MG: 3 TABLET ORAL at 20:38

## 2023-09-22 RX ADMIN — DIVALPROEX SODIUM 500 MG: 500 TABLET, DELAYED RELEASE ORAL at 08:40

## 2023-09-22 RX ADMIN — HYDROXYZINE PAMOATE 50 MG: 50 CAPSULE ORAL at 16:38

## 2023-09-22 RX ADMIN — ACETAMINOPHEN 650 MG: 325 TABLET ORAL at 20:38

## 2023-09-22 RX ADMIN — OLANZAPINE 15 MG: 5 TABLET, FILM COATED ORAL at 20:37

## 2023-09-22 RX ADMIN — OLANZAPINE 2.5 MG: 2.5 TABLET, FILM COATED ORAL at 08:40

## 2023-09-22 RX ADMIN — DIVALPROEX SODIUM 500 MG: 500 TABLET, DELAYED RELEASE ORAL at 20:38

## 2023-09-22 RX ADMIN — HYDROXYZINE PAMOATE 50 MG: 50 CAPSULE ORAL at 08:41

## 2023-09-22 ASSESSMENT — PAIN SCALES - GENERAL: PAINLEVEL_OUTOF10: 0

## 2023-09-22 NOTE — GROUP NOTE
Group Therapy Note    Date: 9/22/2023    Group Start Time: 0392  Group End Time: 4117  Group Topic: Psychotherapy    [facility]    CLAUDIA Sharp, [place]        Group Therapy Note    Attendees: 6       Patient's Goal:  To increase social interaction and improve relationships with others. Notes:  Pt was attentive in group and was able to identify an agenda. They were also able to verbalize relating to others within the group. Status After Intervention:  Improved    Participation Level:  Active Listener and Interactive    Participation Quality: Appropriate, Attentive, Sharing, and Supportive      Speech:  normal      Thought Process/Content: Logical      Affective Functioning: Congruent      Mood: anxious      Level of consciousness:  Alert and Oriented x4      Response to Learning: Able to verbalize current knowledge/experience      Endings: None Reported    Modes of Intervention: Support, Socialization, and Exploration      Discipline Responsible: /Counselor      Signature:  CLAUDIA Lowry, [place]

## 2023-09-22 NOTE — GROUP NOTE
Group Therapy Note    Date: 9/22/2023    Group Start Time: 1000  Group End Time: 1050  Group Topic: Psychoeducation    [facility] [address], [place]                                                                        Group Therapy Note    Date: 9/22/2023  Module Name:  self esteem  Patient's Goal:  patients will be working on setting a positive affirmation that he/she would like to commit to. Notes: Patient engaged and sharing, able to participate in group conversation. Status After Intervention:  Improved    Participation Level:  Active Listener and Interactive    Participation Quality: Appropriate, Attentive, Sharing, and Supportive      Speech:  normal      Thought Process/Content: Logical      Affective Functioning: Congruent      Mood: euthymic      Level of consciousness:  Alert, Oriented x4, and Attentive      Response to Learning: Able to verbalize/acknowledge new learning, Able to retain information, and Progressing to goal      Endings: None Reported    Modes of Intervention: Education, Support, Socialization, Exploration, and Problem-solving      Discipline Responsible: Psychoeducational Specialist      Signature:  Mary Jane Wright

## 2023-09-23 PROCEDURE — 6370000000 HC RX 637 (ALT 250 FOR IP): Performed by: NURSE PRACTITIONER

## 2023-09-23 PROCEDURE — 99232 SBSQ HOSP IP/OBS MODERATE 35: CPT | Performed by: NURSE PRACTITIONER

## 2023-09-23 PROCEDURE — 1240000000 HC EMOTIONAL WELLNESS R&B

## 2023-09-23 PROCEDURE — 6370000000 HC RX 637 (ALT 250 FOR IP): Performed by: PSYCHIATRY & NEUROLOGY

## 2023-09-23 RX ORDER — BENZTROPINE MESYLATE 1 MG/1
1 TABLET ORAL 2 TIMES DAILY
Status: DISCONTINUED | OUTPATIENT
Start: 2023-09-23 | End: 2023-09-26 | Stop reason: HOSPADM

## 2023-09-23 RX ADMIN — MELATONIN 3 MG ORAL TABLET 3 MG: 3 TABLET ORAL at 21:15

## 2023-09-23 RX ADMIN — DIVALPROEX SODIUM 500 MG: 500 TABLET, DELAYED RELEASE ORAL at 08:44

## 2023-09-23 RX ADMIN — BENZTROPINE MESYLATE 1 MG: 1 TABLET ORAL at 13:20

## 2023-09-23 RX ADMIN — BENZTROPINE MESYLATE 1 MG: 1 TABLET ORAL at 20:52

## 2023-09-23 RX ADMIN — OLANZAPINE 5 MG: 5 TABLET, FILM COATED ORAL at 20:50

## 2023-09-23 RX ADMIN — DIVALPROEX SODIUM 500 MG: 500 TABLET, DELAYED RELEASE ORAL at 20:52

## 2023-09-23 RX ADMIN — HYDROXYZINE PAMOATE 50 MG: 50 CAPSULE ORAL at 18:10

## 2023-09-23 RX ADMIN — OLANZAPINE 5 MG: 5 TABLET, FILM COATED ORAL at 08:44

## 2023-09-23 RX ADMIN — HYDROXYZINE PAMOATE 50 MG: 50 CAPSULE ORAL at 08:45

## 2023-09-23 ASSESSMENT — PAIN SCALES - GENERAL: PAINLEVEL_OUTOF10: 0

## 2023-09-23 NOTE — GROUP NOTE
Group Therapy Note    Date: 9/23/2023    Group Start Time: 1110  Group End Time: 1140  Group Topic: Cognitive Skills    [facility]    CLAUDIA Sharp LSW        Group Therapy Note    Attendees: 16       Patient's Goal: to participate in a mindfulness meditation. Notes: pt was an active participant in group. Status After Intervention:  Improved    Participation Level:  Active Listener    Participation Quality: Appropriate and Attentive      Speech:  normal      Thought Process/Content: Logical      Affective Functioning: Congruent      Mood: anxious      Level of consciousness:  Alert and Oriented x4      Response to Learning: Able to verbalize current knowledge/experience      Endings: None Reported    Modes of Intervention: Education, Support, Socialization, Exploration, Clarifying, and Problem-solving      Discipline Responsible: /Counselor      Signature:  CLAUDIA Scott LSW

## 2023-09-23 NOTE — GROUP NOTE
Group Therapy Note    Date: 9/23/2023    Group Start Time: 1005  Group End Time: 2361  Group Topic: Psychoeducation    [facility] 2    Katie Whitmore                                                                        Group Therapy Note    Date: 9/23/2023  Start Time: 1005  End Time:  5280  Number of Participants: 16    Type of Group: Psychoeducation    Wellness Binder Information  Module Name:  Student Nursing students Art as a therapeutic intervention. Patient's Goal:  Explore the use of art as a coping mechanism. Increase socialization amongst peers, and explore one new potential leisure interests    Notes:  Nursing students from [employer] the use of art as a therapeutic intervention. Patient receptive to information provided, and demonstrated use of art through canvas and painting. Patient accepting of handout. Status After Intervention:  Improved    Participation Level:  Active Listener and Interactive    Participation Quality: Appropriate, Attentive, and Sharing      Speech:  normal      Thought Process/Content: Logical      Affective Functioning: Congruent      Mood: depressed      Level of consciousness:  Alert and Attentive      Response to Learning: Able to verbalize current knowledge/experience and Able to verbalize/acknowledge new learning      Endings: None Reported    Modes of Intervention: Education, Exploration, and Activity      Discipline Responsible: Psychoeducational Specialist      Signature:  Katie Whitmore

## 2023-09-24 PROCEDURE — 6370000000 HC RX 637 (ALT 250 FOR IP): Performed by: PSYCHIATRY & NEUROLOGY

## 2023-09-24 PROCEDURE — 99232 SBSQ HOSP IP/OBS MODERATE 35: CPT | Performed by: NURSE PRACTITIONER

## 2023-09-24 PROCEDURE — 6370000000 HC RX 637 (ALT 250 FOR IP): Performed by: NURSE PRACTITIONER

## 2023-09-24 PROCEDURE — 1240000000 HC EMOTIONAL WELLNESS R&B

## 2023-09-24 RX ORDER — OLANZAPINE 10 MG/1
10 TABLET ORAL NIGHTLY
Status: DISCONTINUED | OUTPATIENT
Start: 2023-09-24 | End: 2023-09-25

## 2023-09-24 RX ORDER — RISPERIDONE 1 MG/1
1 TABLET ORAL NIGHTLY
Status: DISCONTINUED | OUTPATIENT
Start: 2023-09-24 | End: 2023-09-25

## 2023-09-24 RX ADMIN — HYDROXYZINE PAMOATE 50 MG: 50 CAPSULE ORAL at 18:17

## 2023-09-24 RX ADMIN — OLANZAPINE 5 MG: 5 TABLET, FILM COATED ORAL at 08:54

## 2023-09-24 RX ADMIN — MELATONIN 3 MG ORAL TABLET 3 MG: 3 TABLET ORAL at 21:20

## 2023-09-24 RX ADMIN — DIVALPROEX SODIUM 500 MG: 500 TABLET, DELAYED RELEASE ORAL at 08:54

## 2023-09-24 RX ADMIN — OLANZAPINE 10 MG: 10 TABLET, FILM COATED ORAL at 20:39

## 2023-09-24 RX ADMIN — HYDROXYZINE PAMOATE 50 MG: 50 CAPSULE ORAL at 08:55

## 2023-09-24 RX ADMIN — BENZTROPINE MESYLATE 1 MG: 1 TABLET ORAL at 20:39

## 2023-09-24 RX ADMIN — RISPERIDONE 1 MG: 1 TABLET ORAL at 20:39

## 2023-09-24 RX ADMIN — ACETAMINOPHEN 650 MG: 325 TABLET ORAL at 12:09

## 2023-09-24 RX ADMIN — DIVALPROEX SODIUM 500 MG: 500 TABLET, DELAYED RELEASE ORAL at 20:39

## 2023-09-24 RX ADMIN — BENZTROPINE MESYLATE 1 MG: 1 TABLET ORAL at 08:54

## 2023-09-24 ASSESSMENT — PAIN SCALES - WONG BAKER: WONGBAKER_NUMERICALRESPONSE: 0

## 2023-09-24 ASSESSMENT — PAIN - FUNCTIONAL ASSESSMENT: PAIN_FUNCTIONAL_ASSESSMENT: ACTIVITIES ARE NOT PREVENTED

## 2023-09-24 ASSESSMENT — PAIN SCALES - GENERAL
PAINLEVEL_OUTOF10: 0
PAINLEVEL_OUTOF10: 6
PAINLEVEL_OUTOF10: 0

## 2023-09-24 ASSESSMENT — PAIN DESCRIPTION - LOCATION: LOCATION: HEAD

## 2023-09-24 ASSESSMENT — PAIN DESCRIPTION - DESCRIPTORS: DESCRIPTORS: ACHING;DISCOMFORT;THROBBING

## 2023-09-24 NOTE — GROUP NOTE
Group Therapy Note    Date: 9/24/2023    Group Start Time: 1100  Group End Time: 1140  Group Topic: Cognitive Skills    [facility] [address], [place], [place]        Group Therapy Note    Attendees: 18       Patient's Goal:  pt will be able to identify new habits they can start to help accomplish goals for the future. Notes:  pt participated in group and made connections. Status After Intervention:  Improved    Participation Level:  Active Listener and Interactive    Participation Quality: Appropriate, Attentive, and Sharing      Speech:  normal      Thought Process/Content: Logical  Linear      Affective Functioning: Congruent      Mood: anxious      Level of consciousness:  Alert, Oriented x4, and Attentive      Response to Learning: Able to verbalize current knowledge/experience, Able to verbalize/acknowledge new learning, Able to retain information, and Capable of insight      Endings: None Reported    Modes of Intervention: Education, Support, Socialization, Exploration, Clarifying, and Problem-solving      Discipline Responsible: /Counselor      Signature:  Roberto Elizondo [facility] [place]

## 2023-09-24 NOTE — GROUP NOTE
Group Therapy Note    Date: 9/24/2023    Group Start Time: 3384  Group End Time: 5415  Group Topic: Psychoeducation    [facility] 2    Jose Trotter                                                                        Group Therapy Note    Date: 9/24/2023  Start Time: 1015  End Time:  1055  Number of Participants: 14    Type of Group: Psychoeducation    Wellness Binder Information  Module Name:  Self-Love and Who are you? Patient's Goal:  ID one or two ways to improve self-love. Explore values and clarify personas of oneself with Who are you handout. Notes:  CTRS discussed ways to improve self-love/esteem. CTRS then demonstrated activity of Who are you? Patient accepting of handout and was receptive to information received. Status After Intervention:  Improved    Participation Level:  Active Listener and Interactive    Participation Quality: Appropriate, Attentive, and Sharing      Speech:  normal      Thought Process/Content: Logical      Affective Functioning: Congruent      Mood: euthymic      Level of consciousness:  Alert and Attentive      Response to Learning: Able to verbalize current knowledge/experience and Able to verbalize/acknowledge new learning      Endings: None Reported    Modes of Intervention: Education and Exploration      Discipline Responsible: Psychoeducational Specialist      Signature:  Jose Trotter

## 2023-09-25 PROCEDURE — 99232 SBSQ HOSP IP/OBS MODERATE 35: CPT | Performed by: NURSE PRACTITIONER

## 2023-09-25 PROCEDURE — 6370000000 HC RX 637 (ALT 250 FOR IP): Performed by: NURSE PRACTITIONER

## 2023-09-25 PROCEDURE — 1240000000 HC EMOTIONAL WELLNESS R&B

## 2023-09-25 PROCEDURE — 6370000000 HC RX 637 (ALT 250 FOR IP): Performed by: PSYCHIATRY & NEUROLOGY

## 2023-09-25 RX ORDER — OLANZAPINE 5 MG/1
5 TABLET ORAL NIGHTLY
Status: DISCONTINUED | OUTPATIENT
Start: 2023-09-25 | End: 2023-09-25

## 2023-09-25 RX ORDER — RISPERIDONE 1 MG/1
1 TABLET ORAL 2 TIMES DAILY
Status: DISCONTINUED | OUTPATIENT
Start: 2023-09-25 | End: 2023-09-26 | Stop reason: HOSPADM

## 2023-09-25 RX ORDER — OLANZAPINE 5 MG/1
5 TABLET ORAL NIGHTLY
Status: COMPLETED | OUTPATIENT
Start: 2023-09-25 | End: 2023-09-25

## 2023-09-25 RX ADMIN — BENZTROPINE MESYLATE 1 MG: 1 TABLET ORAL at 08:44

## 2023-09-25 RX ADMIN — DIVALPROEX SODIUM 500 MG: 500 TABLET, DELAYED RELEASE ORAL at 20:50

## 2023-09-25 RX ADMIN — OLANZAPINE 5 MG: 5 TABLET, FILM COATED ORAL at 20:51

## 2023-09-25 RX ADMIN — DIVALPROEX SODIUM 500 MG: 500 TABLET, DELAYED RELEASE ORAL at 08:44

## 2023-09-25 RX ADMIN — BENZTROPINE MESYLATE 1 MG: 1 TABLET ORAL at 20:50

## 2023-09-25 RX ADMIN — MELATONIN 3 MG ORAL TABLET 3 MG: 3 TABLET ORAL at 20:50

## 2023-09-25 RX ADMIN — RISPERIDONE 1 MG: 1 TABLET ORAL at 10:02

## 2023-09-25 RX ADMIN — HYDROXYZINE PAMOATE 50 MG: 50 CAPSULE ORAL at 08:44

## 2023-09-25 RX ADMIN — RISPERIDONE 1 MG: 1 TABLET ORAL at 20:50

## 2023-09-25 ASSESSMENT — PAIN SCALES - GENERAL
PAINLEVEL_OUTOF10: 0
PAINLEVEL_OUTOF10: 0

## 2023-09-25 NOTE — GROUP NOTE
Group Therapy Note    Date: 9/25/2023    Group Start Time: 1010  Group End Time: 1050  Group Topic: Psychoeducation    [facility] [address], [place]                                                                        Group Therapy Note    Date: 9/25/2023  Group name: Social Wellness   Patient's Goal:  Patient will be able to identify positive/negative characteristics of relationships. Notes:  Patient pleasant and sharing in group, willing to accept handout. Status After Intervention:  Improved    Participation Level:  Active Listener and Interactive    Participation Quality: Appropriate, Attentive, and Sharing      Speech:  normal      Thought Process/Content: Logical      Affective Functioning: Congruent      Mood: euthymic      Level of consciousness:  Alert, Oriented x4, and Attentive      Response to Learning: Able to verbalize/acknowledge new learning, Able to retain information, and Progressing to goal      Endings: None Reported    Modes of Intervention: Education, Support, Socialization, and Problem-solving      Discipline Responsible: Psychoeducational Specialist      Signature:  Cuba Murphy

## 2023-09-25 NOTE — GROUP NOTE
Group Therapy Note    Date: 9/25/2023    Group Start Time: 1100  Group End Time: 0592  Group Topic: Psychotherapy    [facility]  [address], [facility] [place]        Group Therapy Note    Attendees: 7       Patient's Goal:  To increase socialization and improve interpersonal relationships. Notes:  Pt was an active participant in group discussion. Status After Intervention:  Improved    Participation Level: Active Listener and Interactive    Participation Quality: Appropriate, Attentive, Sharing, and Supportive      Speech:  normal      Thought Process/Content: Logical  Linear      Affective Functioning: Congruent      Mood: anxious      Level of consciousness:  Alert, Oriented x4, and Attentive      Response to Learning: Able to verbalize current knowledge/experience, Able to verbalize/acknowledge new learning, Able to retain information, Capable of insight, and Progressing to goal      Endings: None Reported    Modes of Intervention: Support, Socialization, and Exploration      Discipline Responsible: /Counselor      Signature:   CLAUDIA Elliott, [place]

## 2023-09-26 VITALS
DIASTOLIC BLOOD PRESSURE: 58 MMHG | SYSTOLIC BLOOD PRESSURE: 106 MMHG | WEIGHT: 176 LBS | TEMPERATURE: 97.8 F | HEART RATE: 64 BPM | BODY MASS INDEX: 30.05 KG/M2 | OXYGEN SATURATION: 97 % | HEIGHT: 64 IN | RESPIRATION RATE: 14 BRPM

## 2023-09-26 LAB
DATE LAST DOSE: ABNORMAL
TME LAST DOSE: ABNORMAL H
VALPROATE SERPL-MCNC: 30 UG/ML (ref 50–100)
VANCOMYCIN DOSE: ABNORMAL MG

## 2023-09-26 PROCEDURE — 6370000000 HC RX 637 (ALT 250 FOR IP): Performed by: NURSE PRACTITIONER

## 2023-09-26 PROCEDURE — 99239 HOSP IP/OBS DSCHRG MGMT >30: CPT | Performed by: NURSE PRACTITIONER

## 2023-09-26 PROCEDURE — 36415 COLL VENOUS BLD VENIPUNCTURE: CPT

## 2023-09-26 PROCEDURE — 80164 ASSAY DIPROPYLACETIC ACD TOT: CPT

## 2023-09-26 RX ORDER — DIVALPROEX SODIUM 500 MG/1
500 TABLET, DELAYED RELEASE ORAL EVERY 12 HOURS SCHEDULED
Qty: 90 TABLET | Refills: 3 | Status: ON HOLD | OUTPATIENT
Start: 2023-09-26

## 2023-09-26 RX ORDER — BENZTROPINE MESYLATE 1 MG/1
1 TABLET ORAL 2 TIMES DAILY
Qty: 60 TABLET | Refills: 3 | Status: ON HOLD | OUTPATIENT
Start: 2023-09-26

## 2023-09-26 RX ORDER — RISPERIDONE 1 MG/1
1 TABLET ORAL 2 TIMES DAILY
Qty: 60 TABLET | Refills: 0 | Status: ON HOLD | OUTPATIENT
Start: 2023-09-26 | End: 2023-10-26

## 2023-09-26 RX ADMIN — RISPERIDONE 1 MG: 1 TABLET ORAL at 09:15

## 2023-09-26 RX ADMIN — HYDROXYZINE PAMOATE 50 MG: 50 CAPSULE ORAL at 09:16

## 2023-09-26 RX ADMIN — DIVALPROEX SODIUM 500 MG: 500 TABLET, DELAYED RELEASE ORAL at 09:15

## 2023-09-26 RX ADMIN — BENZTROPINE MESYLATE 1 MG: 1 TABLET ORAL at 09:15

## 2023-09-26 ASSESSMENT — PAIN SCALES - GENERAL: PAINLEVEL_OUTOF10: 0
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
## 2023-09-26 NOTE — GROUP NOTE
Group Therapy Note    Date: 9/26/2023    Group Start Time: 1010  Group End Time: 8917  Group Topic: Psychoeducation    [facility] 2    Jose Lemos                                                                        Group Therapy Note    Date: 9/26/2023  Start Time: 1010  End Time:  1050  Number of Participants: 17    Type of Group: Psychoeducation    Wellness Binder Information  Module Name:  Grounding Techniques and 08738 Method      Patient's Goal:  ID one or two grounding techniques one can utilize to bring self back to present. Explore and demonstrate knowledge of 30377 method. Notes:  CTRS discussed a different variety of grounding techniques and provided examples. CTRS then demonstrated 58542 method. Patient accepting of handout and receptive to information provided. Status After Intervention:  Improved    Participation Level:  Active Listener and Interactive    Participation Quality: Appropriate, Attentive, and Sharing      Speech:  normal      Thought Process/Content: Logical      Affective Functioning: Congruent      Mood: euthymic      Level of consciousness:  Alert and Attentive      Response to Learning: Able to verbalize current knowledge/experience and Able to verbalize/acknowledge new learning      Endings: None Reported    Modes of Intervention: Education, Support, and Exploration      Discipline Responsible: Psychoeducational Specialist      Signature:  Jose Lemos

## 2023-09-26 NOTE — GROUP NOTE
Group Therapy Note    Date: 9/26/2023    Group Start Time: 1100  Group End Time: 1200  Group Topic: Psychotherapy    [facility]  1416 CLAUDIA Roblero, LSW        Group Therapy Note    Attendees: 6       Patient's Goal:  To increase socialization and improve interpersonal relationships. Notes:  Pt was an active participant in group discussion. Status After Intervention:  Improved    Participation Level: Active Listener and Interactive    Participation Quality: Appropriate, Attentive, Sharing, and Supportive      Speech:  normal      Thought Process/Content: Logical  Linear      Affective Functioning: Congruent      Mood: euthymic      Level of consciousness:  Alert, Oriented x4, and Attentive      Response to Learning: Able to verbalize current knowledge/experience, Able to verbalize/acknowledge new learning, Able to retain information, Capable of insight, and Progressing to goal      Endings: None Reported    Modes of Intervention: Support, Socialization, and Exploration      Discipline Responsible: /Counselor      Signature:   CLAUDIA Chacon, Katelyn Plaza

## 2023-09-26 NOTE — DISCHARGE INSTRUCTIONS
Follow up for Tobacco Cessation at:    [address] [facility]                              Date:  Tuesday 10/3 at [facility], [address] [facility], [facility] [address] [place]   Phone: [phone]   Fax: [phone]     Inpatient Substance Use Agencies      [place]- Phone: [phone] Fax: [phone] (MAT) [place]) (any insurance)     [place]- Phone: [phone] Fax: [phone] [place]) (no Medicare)     [facility]- Phone: [phone] Fax: [phone] ([address] [place] [address])     [address]- Phone: [phone] Fax: [phone](ENK-MDJG suboxone)([place]/ [place])(No Medicare)      [facility]- Phone: [phone] Fax: [phone] (MAT FEMALES- only suboxone) Cee Parmar) (no Medicare)     [address]- Phone: [phone] Fax: [phone] (MAT MALES) Cee Parmar)     [facility]- Phone: [phone] Fax: [phone] (MAT) ([place]) (no Medicare)     [facility]- [place]- all male, [facility]- all female (MAT- would have to get approval for methadone dose and then would taper down and start suboxone)      [facility]- Phone: [phone] Fax: [phone] Autumn Hoang)      [facility]- Phone: [phone] Fax: [phone] [facility]) (any insurance)      [address] [facility]- Phone: [phone] Fax: [phone] Jacek Guardado)      [facility]- Phone: [phone] Fax:[phone] Autumn Hoang)       [place] Phone: [phone] Fax: [phone] Jacek Guardado [place])     [address] [facility]- Phone: [phone] Fax: [phone] ([address])     [address] Phone: [phone] Fax: [email or web address] ([facility])     The [address] Phone: [phone] Fax: [place]NE[place])      [address] [facility] [address] ([facility]) Phone: [phone] ([facility])

## 2023-09-26 NOTE — PLAN OF CARE
[address] [facility] Interdisciplinary Treatment Plan Note     Review Date & Time:  9/25/23 0910    Patient was in treatment team.    Estimated Length of Stay Update:   10 DAYS   Estimated Discharge Date Update:   TOMORROW    EDUCATION:   Learner Progress Toward Treatment Goals: Reviewed results and recommendations of this team    Method: Small group    Outcome: Verbalized understanding and Needs reinforcement    PATIENT GOALS: \"CALL THE COURT\"    PLAN/TREATMENT RECOMMENDATIONS UPDATE:  DISCHARGE TO POLICE HOLD TOMORROW. CONTINUE TO CROSS TAPER MEDICATIONS.     GOALS UPDATE:  Time frame for Short-Term Goals:  10 DAYS      Damien Hill RN
Patient is alert and oriented x 4. Denies pain. No noted signs or symptoms of distress. Denies SI/HI, A/V/H, or thoughts of self harm when asked but does state \"I wish I was dead\". . . \"I'm just tired of dealing with this\". Patient noted to become tearful. Does contract for safety. This is incongruent as patient was just on unit socializing with her peers and was laughing, conversing when I asked her to speak with me. Patient goes on to state \"I don't feel as irritable, but now I just feel sad and depressed\". Attended on unit groups this shift. Medication compliant. Incongruent affect. Appropriate with peers and staff. Appears well groomed/neat, room Is clean. Goal for the day was - \"Not to be so hard on myself\". Up for all meals. Will continue to monitor for safety q 15 minute safety rounds and environmental assessments. Problem: Behavior  Goal: Pt/Family maintain appropriate behavior and adhere to behavioral management agreement, if implemented  Description: INTERVENTIONS:  1. Assess patient/family's coping skills and  non-compliant behavior (including use of illegal substances)  2. Notify security of behavior or suspected illegal substances which indicate the need for search of the family and/or belongings  3. Encourage verbalization of thoughts and concerns in a socially appropriate manner  4. Utilize positive, consistent limit setting strategies supporting safety of patient, staff and others  5. Encourage participation in the decision making process about the behavioral management agreement  6. If a visitor's behavior poses a threat to safety call refer to organization policy.   7. Initiate consult with , Psychosocial CNS, Spiritual Care as appropriate  Outcome: Not Progressing  Flowsheets (Taken 9/24/2023 1227)  Patient/family maintains appropriate behavior and adheres to behavioral management agreement, if implemented: Encourage participation in the decision
Problem: Psychosis  Goal: Will report no hallucinations or delusions  Description: INTERVENTIONS:  1. Administer medication as  ordered  2. Assist with reality testing to support increasing orientation  3. Assess if patient's hallucinations or delusions are encouraging self harm or harm to others and intervene as appropriate  9/25/2023 2043 by Maren Phillip RN  Outcome: Progressing  9/25/2023 1101 by Adelina Alaniz RN  Outcome: Progressing     Problem: Behavior  Goal: Pt/Family maintain appropriate behavior and adhere to behavioral management agreement, if implemented  Description: INTERVENTIONS:  1. Assess patient/family's coping skills and  non-compliant behavior (including use of illegal substances)  2. Notify security of behavior or suspected illegal substances which indicate the need for search of the family and/or belongings  3. Encourage verbalization of thoughts and concerns in a socially appropriate manner  4. Utilize positive, consistent limit setting strategies supporting safety of patient, staff and others  5. Encourage participation in the decision making process about the behavioral management agreement  6. If a visitor's behavior poses a threat to safety call refer to organization policy. 7. Initiate consult with , Psychosocial CNS, Spiritual Care as appropriate  9/25/2023 2043 by Maren Phillip RN  Outcome: Progressing  9/25/2023 1101 by Adelina Alaniz RN  Outcome: Progressing     Problem: Anxiety  Goal: Will report anxiety at manageable levels  Description: INTERVENTIONS:  1. Administer medication as ordered  2. Teach and rehearse alternative coping skills  3. Provide emotional support with 1:1 interaction with staff  9/25/2023 2043 by Maren Phillip RN  Outcome: Progressing  9/25/2023 1101 by Adelina Alaniz RN  Outcome: Progressing     Pt denies SI, HI and AVH. Pt stated she was upset today when she found out about her mother trying to get custody of her kids.  \"I did yell at her
Problem: Psychosis  Goal: Will report no hallucinations or delusions  Description: INTERVENTIONS:  1. Administer medication as  ordered  2. Assist with reality testing to support increasing orientation  3. Assess if patient's hallucinations or delusions are encouraging self harm or harm to others and intervene as appropriate  Outcome: Progressing     Problem: Anxiety  Goal: Will report anxiety at manageable levels  Description: INTERVENTIONS:  1. Administer medication as ordered  2. Teach and rehearse alternative coping skills  3. Provide emotional support with 1:1 interaction with staff  Outcome: Progressing     Problem: Behavior  Goal: Pt/Family maintain appropriate behavior and adhere to behavioral management agreement, if implemented  Description: INTERVENTIONS:  1. Assess patient/family's coping skills and  non-compliant behavior (including use of illegal substances)  2. Notify security of behavior or suspected illegal substances which indicate the need for search of the family and/or belongings  3. Encourage verbalization of thoughts and concerns in a socially appropriate manner  4. Utilize positive, consistent limit setting strategies supporting safety of patient, staff and others  5. Encourage participation in the decision making process about the behavioral management agreement  6. If a visitor's behavior poses a threat to safety call refer to organization policy.   7. Initiate consult with , Psychosocial CNS, Spiritual Care as appropriate  Outcome: Progressing
Problem: Psychosis  Goal: Will report no hallucinations or delusions  Description: INTERVENTIONS:  1. Administer medication as  ordered  2. Assist with reality testing to support increasing orientation  3. Assess if patient's hallucinations or delusions are encouraging self harm or harm to others and intervene as appropriate  Outcome: Progressing     Problem: Behavior  Goal: Pt/Family maintain appropriate behavior and adhere to behavioral management agreement, if implemented  Description: INTERVENTIONS:  1. Assess patient/family's coping skills and  non-compliant behavior (including use of illegal substances)  2. Notify security of behavior or suspected illegal substances which indicate the need for search of the family and/or belongings  3. Encourage verbalization of thoughts and concerns in a socially appropriate manner  4. Utilize positive, consistent limit setting strategies supporting safety of patient, staff and others  5. Encourage participation in the decision making process about the behavioral management agreement  6. If a visitor's behavior poses a threat to safety call refer to organization policy. 7. Initiate consult with , Psychosocial CNS, Spiritual Care as appropriate  Outcome: Progressing     Problem: Anxiety  Goal: Will report anxiety at manageable levels  Description: INTERVENTIONS:  1. Administer medication as ordered  2. Teach and rehearse alternative coping skills  3.  Provide emotional support with 1:1 interaction with staff  Outcome: Progressing
Problem: Psychosis  Goal: Will report no hallucinations or delusions  Description: INTERVENTIONS:  1. Administer medication as  ordered  2. Assist with reality testing to support increasing orientation  3. Assess if patient's hallucinations or delusions are encouraging self harm or harm to others and intervene as appropriate  Outcome: Progressing     Problem: Behavior  Goal: Pt/Family maintain appropriate behavior and adhere to behavioral management agreement, if implemented  Description: INTERVENTIONS:  1. Assess patient/family's coping skills and  non-compliant behavior (including use of illegal substances)  2. Notify security of behavior or suspected illegal substances which indicate the need for search of the family and/or belongings  3. Encourage verbalization of thoughts and concerns in a socially appropriate manner  4. Utilize positive, consistent limit setting strategies supporting safety of patient, staff and others  5. Encourage participation in the decision making process about the behavioral management agreement  6. If a visitor's behavior poses a threat to safety call refer to organization policy. 7. Initiate consult with , Psychosocial CNS, Spiritual Care as appropriate  Outcome: Progressing     Problem: Anxiety  Goal: Will report anxiety at manageable levels  Description: INTERVENTIONS:  1. Administer medication as ordered  2. Teach and rehearse alternative coping skills  3. Provide emotional support with 1:1 interaction with staff  Outcome: Progressing    Pt denies SI, HI and AVH. Pt isolative after dinner. Medication compliant. Attends groups. Appetite appropriate. Presents sad. Guarded. Stated she was feeling [place]. \" Pt verbalized she was starting on two new meds this HS and how she was hopeful they will help. Will continue to monitor.
Problem: Risk for Elopement  Goal: Patient will not exit the unit/facility without proper excort  9/21/2023 1849 by Ita Espinal RN  Outcome: Progressing  9/21/2023 1102 by Festus Kocher, RN  Outcome: Progressing     Problem: Psychosis  Goal: Will report no hallucinations or delusions  Description: INTERVENTIONS:  1. Administer medication as  ordered  2. Assist with reality testing to support increasing orientation  3. Assess if patient's hallucinations or delusions are encouraging self harm or harm to others and intervene as appropriate  9/21/2023 1849 by Ita Espinal RN  Outcome: Progressing  9/21/2023 1102 by Festus Kocher, RN  Outcome: Progressing     Problem: Involuntary Admit  Goal: Will cooperate with staff recommendations and doctor's orders and will demonstrate appropriate behavior  Description: INTERVENTIONS:  1. Treat underlying conditions and offer medication as ordered  2. Educate regarding involuntary admission procedures and rules  3. Contain excessive/inappropriate behavior per unit and hospital policies  [id number] by Festus Kocher, RN  Outcome: Progressing     Problem: Nutrition Deficit:  Goal: Optimize nutritional status  9/21/2023 1849 by Ita Espinal RN  Outcome: Progressing  9/21/2023 1102 by Festus Kocher, RN  Outcome: Progressing    OUT AND SOCIAL ON THE UNIT. DINNER TAKEN WELL AND TOOK 6 PACKS OF SALTINES BEFORE DINNER. IN LOUNGE WATCHING TV. NAPPED AFTER 7. DENIES SUICIDAL THOUGHTS OR INTENT TO HARM SELF OR OTHERS.
Problem: Risk for Elopement  Goal: Patient will not exit the unit/facility without proper excort  9/22/2023 1813 by Kiki Baeza RN  Outcome: Progressing     Problem: Psychosis  Goal: Will report no hallucinations or delusions  Description: INTERVENTIONS:  1. Administer medication as  ordered  2. Assist with reality testing to support increasing orientation  3. Assess if patient's hallucinations or delusions are encouraging self harm or harm to others and intervene as appropriate  9/22/2023 1813 by Kiki Baeza RN  Outcome: Progressing     Problem: Behavior  Goal: Pt/Family maintain appropriate behavior and adhere to behavioral management agreement, if implemented  Description: INTERVENTIONS:  1. Assess patient/family's coping skills and  non-compliant behavior (including use of illegal substances)  2. Notify security of behavior or suspected illegal substances which indicate the need for search of the family and/or belongings  3. Encourage verbalization of thoughts and concerns in a socially appropriate manner  4. Utilize positive, consistent limit setting strategies supporting safety of patient, staff and others  5. Encourage participation in the decision making process about the behavioral management agreement  6. If a visitor's behavior poses a threat to safety call refer to organization policy. 7. Initiate consult with , Psychosocial CNS, Spiritual Care as appropriate  9/22/2023 1813 by Kiki Baeza RN  Outcome: Progressing     Problem: Anxiety  Goal: Will report anxiety at manageable levels  Description: INTERVENTIONS:  1. Administer medication as ordered  2. Teach and rehearse alternative coping skills  3. Provide emotional support with 1:1 interaction with staff  9/22/2023 1813 by Kiki Baeza RN  Outcome: Progressing     Problem: Drug Abuse/Detox  Goal: Will have no detox symptoms and will verbalize plan for changing drug-related behavior  Description: INTERVENTIONS:  1.
Problem: Risk for Elopement  Goal: Patient will not exit the unit/facility without proper excort  9/24/2023 1903 by Ramila Bustamante RN  Outcome: Progressing     Problem: Psychosis  Goal: Will report no hallucinations or delusions  Description: INTERVENTIONS:  1. Administer medication as  ordered  2. Assist with reality testing to support increasing orientation  3. Assess if patient's hallucinations or delusions are encouraging self harm or harm to others and intervene as appropriate  Outcome: Progressing     Problem: Behavior  Goal: Pt/Family maintain appropriate behavior and adhere to behavioral management agreement, if implemented  Description: INTERVENTIONS:  1. Assess patient/family's coping skills and  non-compliant behavior (including use of illegal substances)  2. Notify security of behavior or suspected illegal substances which indicate the need for search of the family and/or belongings  3. Encourage verbalization of thoughts and concerns in a socially appropriate manner  4. Utilize positive, consistent limit setting strategies supporting safety of patient, staff and others  5. Encourage participation in the decision making process about the behavioral management agreement  6. If a visitor's behavior poses a threat to safety call refer to organization policy. 7. Initiate consult with , Psychosocial CNS, Spiritual Care as appropriate  9/24/2023 1903 by Ramila Bustamante RN  Outcome: Progressing     Problem: Anxiety  Goal: Will report anxiety at manageable levels  Description: INTERVENTIONS:  1. Administer medication as ordered  2. Teach and rehearse alternative coping skills  3. Provide emotional support with 1:1 interaction with staff  9/24/2023 1903 by Ramial Bustamante RN  Outcome: Progressing     Problem: Drug Abuse/Detox  Goal: Will have no detox symptoms and will verbalize plan for changing drug-related behavior  Description: INTERVENTIONS:  1. Administer medication as ordered  2.  Monitor
Problem: Risk for Elopement  Goal: Patient will not exit the unit/facility without proper excort  9/25/2023 1101 by Pb Perales RN  Outcome: Progressing  9/25/2023 0517 by Rubio Barraza RN  Outcome: Progressing     Problem: Psychosis  Goal: Will report no hallucinations or delusions  Description: INTERVENTIONS:  1. Administer medication as  ordered  2. Assist with reality testing to support increasing orientation  3. Assess if patient's hallucinations or delusions are encouraging self harm or harm to others and intervene as appropriate  9/25/2023 1101 by Pb Perales RN  Outcome: Progressing  9/25/2023 0517 by Rubio Barraza RN  Outcome: Progressing     Problem: Behavior  Goal: Pt/Family maintain appropriate behavior and adhere to behavioral management agreement, if implemented  Description: INTERVENTIONS:  1. Assess patient/family's coping skills and  non-compliant behavior (including use of illegal substances)  2. Notify security of behavior or suspected illegal substances which indicate the need for search of the family and/or belongings  3. Encourage verbalization of thoughts and concerns in a socially appropriate manner  4. Utilize positive, consistent limit setting strategies supporting safety of patient, staff and others  5. Encourage participation in the decision making process about the behavioral management agreement  6. If a visitor's behavior poses a threat to safety call refer to organization policy. 7. Initiate consult with , Psychosocial CNS, Spiritual Care as appropriate  9/25/2023 1101 by Pb Perales RN  Outcome: Progressing  9/25/2023 0517 by Rubio Barraza RN  Outcome: Progressing     Problem: Anxiety  Goal: Will report anxiety at manageable levels  Description: INTERVENTIONS:  1. Administer medication as ordered  2. Teach and rehearse alternative coping skills  3.  Provide emotional support with 1:1 interaction with staff  9/25/2023 1101 by Pb Perales RN  Outcome:
Problem: Risk for Elopement  Goal: Patient will not exit the unit/facility without proper excort  Outcome: Progressing     Problem: Psychosis  Goal: Will report no hallucinations or delusions  Description: INTERVENTIONS:  1. Administer medication as  ordered  2. Assist with reality testing to support increasing orientation  3. Assess if patient's hallucinations or delusions are encouraging self harm or harm to others and intervene as appropriate  9/21/2023 1102 by Shankar Alonso RN  Outcome: Progressing  9/20/2023 2151 by Scotty Lozada RN  Outcome: Progressing     Problem: Behavior  Goal: Pt/Family maintain appropriate behavior and adhere to behavioral management agreement, if implemented  Description: INTERVENTIONS:  1. Assess patient/family's coping skills and  non-compliant behavior (including use of illegal substances)  2. Notify security of behavior or suspected illegal substances which indicate the need for search of the family and/or belongings  3. Encourage verbalization of thoughts and concerns in a socially appropriate manner  4. Utilize positive, consistent limit setting strategies supporting safety of patient, staff and others  5. Encourage participation in the decision making process about the behavioral management agreement  6. If a visitor's behavior poses a threat to safety call refer to organization policy. 7. Initiate consult with , Psychosocial CNS, Spiritual Care as appropriate  9/21/2023 1102 by Shankar Alonso RN  Outcome: Progressing  9/20/2023 2151 by Scotty Lozada RN  Outcome: Progressing     Problem: Anxiety  Goal: Will report anxiety at manageable levels  Description: INTERVENTIONS:  1. Administer medication as ordered  2. Teach and rehearse alternative coping skills  3.  Provide emotional support with 1:1 interaction with staff  9/21/2023 1102 by Shankar Alonso RN  Outcome: Progressing  9/20/2023 2151 by Scotty Lozada RN  Outcome: Progressing     Problem: Drug
Problem: Risk for Elopement  Goal: Patient will not exit the unit/facility without proper excort  Outcome: Progressing     Problem: Psychosis  Goal: Will report no hallucinations or delusions  Description: INTERVENTIONS:  1. Administer medication as  ordered  2. Assist with reality testing to support increasing orientation  3. Assess if patient's hallucinations or delusions are encouraging self harm or harm to others and intervene as appropriate  Outcome: Progressing     Problem: Behavior  Goal: Pt/Family maintain appropriate behavior and adhere to behavioral management agreement, if implemented  Description: INTERVENTIONS:  1. Assess patient/family's coping skills and  non-compliant behavior (including use of illegal substances)  2. Notify security of behavior or suspected illegal substances which indicate the need for search of the family and/or belongings  3. Encourage verbalization of thoughts and concerns in a socially appropriate manner  4. Utilize positive, consistent limit setting strategies supporting safety of patient, staff and others  5. Encourage participation in the decision making process about the behavioral management agreement  6. If a visitor's behavior poses a threat to safety call refer to organization policy. 7. Initiate consult with , Psychosocial CNS, Spiritual Care as appropriate  Outcome: Progressing     Problem: Anxiety  Goal: Will report anxiety at manageable levels  Description: INTERVENTIONS:  1. Administer medication as ordered  2. Teach and rehearse alternative coping skills  3. Provide emotional support with 1:1 interaction with staff  Outcome: Progressing     Problem: Drug Abuse/Detox  Goal: Will have no detox symptoms and will verbalize plan for changing drug-related behavior  Description: INTERVENTIONS:  1. Administer medication as ordered  2. Monitor physical status  3. Provide emotional support with 1:1 interaction with staff  4.  Encourage  recovery focused
Problem: Risk for Elopement  Goal: Patient will not exit the unit/facility without proper excort  Outcome: Progressing    Problem: Behavior  Goal: Pt/Family maintain appropriate behavior and adhere to behavioral management agreement, if implemented  Description: INTERVENTIONS:  1. Assess patient/family's coping skills and  non-compliant behavior (including use of illegal substances)  2. Notify security of behavior or suspected illegal substances which indicate the need for search of the family and/or belongings  3. Encourage verbalization of thoughts and concerns in a socially appropriate manner  4. Utilize positive, consistent limit setting strategies supporting safety of patient, staff and others  5. Encourage participation in the decision making process about the behavioral management agreement  6. If a visitor's behavior poses a threat to safety call refer to organization policy. 7. Initiate consult with , Psychosocial CNS, Spiritual Care as appropriate  Outcome: Progressing     Problem: Psychosis  Goal: Will report no hallucinations or delusions  Description: INTERVENTIONS:  1. Administer medication as  ordered  2. Assist with reality testing to support increasing orientation  3. Assess if patient's hallucinations or delusions are encouraging self harm or harm to others and intervene as appropriate  Outcome: Progressing     Patient sitting in day room with other patients watching television. Verbalized, \"I had a good day but I just took a shower and became very anxious. \" Administered prescribed medication. Rates anxiety as a 7.5/10; denies depression. Denies any suicidal or homicidal ideations. Denies any auditory or visual hallucinations. Encouraged patiuent to let staff know should she have any questions or concerns. Verbalized understanding. Will continue to monitor.
Pt denies suicidal ideation, homicidal ideation, and AV hallucinations. Pt reports sleeping well. She is anxious, guarded, friendly, but does have underlying irritability. She is compliant with medications, and is in control of her behavior at this time. Problem: Risk for Elopement  Goal: Patient will not exit the unit/facility without proper excort  Outcome: Progressing     Problem: Psychosis  Goal: Will report no hallucinations or delusions  Description: INTERVENTIONS:  1. Administer medication as  ordered  2. Assist with reality testing to support increasing orientation  3. Assess if patient's hallucinations or delusions are encouraging self harm or harm to others and intervene as appropriate  9/19/2023 1008 by Fer Jon RN  Outcome: Progressing     Problem: Behavior  Goal: Pt/Family maintain appropriate behavior and adhere to behavioral management agreement, if implemented  Description: INTERVENTIONS:  1. Assess patient/family's coping skills and  non-compliant behavior (including use of illegal substances)  2. Notify security of behavior or suspected illegal substances which indicate the need for search of the family and/or belongings  3. Encourage verbalization of thoughts and concerns in a socially appropriate manner  4. Utilize positive, consistent limit setting strategies supporting safety of patient, staff and others  5. Encourage participation in the decision making process about the behavioral management agreement  6. If a visitor's behavior poses a threat to safety call refer to organization policy. 7. Initiate consult with , Psychosocial CNS, Spiritual Care as appropriate  9/19/2023 1008 by Fer Jon RN  Outcome: Progressing     Problem: Anxiety  Goal: Will report anxiety at manageable levels  Description: INTERVENTIONS:  1. Administer medication as ordered  2. Teach and rehearse alternative coping skills  3.  Provide emotional support with 1:1 interaction with
Pt out in the common area, watching TV and minimally social with peers. Pt denies SI, HI and AVH. Pt requested prns with HS mediations and then went to room to rest. Pt states, \"I'm done with people for the day. \"    Problem: Psychosis  Goal: Will report no hallucinations or delusions  Description: INTERVENTIONS:  1. Administer medication as  ordered  2. Assist with reality testing to support increasing orientation  3. Assess if patient's hallucinations or delusions are encouraging self harm or harm to others and intervene as appropriate  9/20/2023 2151 by Marlyn Oneil RN  Outcome: Progressing     Problem: Behavior  Goal: Pt/Family maintain appropriate behavior and adhere to behavioral management agreement, if implemented  Description: INTERVENTIONS:  1. Assess patient/family's coping skills and  non-compliant behavior (including use of illegal substances)  2. Notify security of behavior or suspected illegal substances which indicate the need for search of the family and/or belongings  3. Encourage verbalization of thoughts and concerns in a socially appropriate manner  4. Utilize positive, consistent limit setting strategies supporting safety of patient, staff and others  5. Encourage participation in the decision making process about the behavioral management agreement  6. If a visitor's behavior poses a threat to safety call refer to organization policy. 7. Initiate consult with , Psychosocial CNS, Spiritual Care as appropriate  9/20/2023 2151 by Marlyn Oneil RN  Outcome: Progressing     Problem: Anxiety  Goal: Will report anxiety at manageable levels  Description: INTERVENTIONS:  1. Administer medication as ordered  2. Teach and rehearse alternative coping skills  3.  Provide emotional support with 1:1 interaction with staff  9/20/2023 2151 by Marlyn Oneil RN  Outcome: Progressing
Pt resting in bed apparently asleep with easy even respirations at HS q 15 min electronic rounding.
INTERVENTIONS:  1. Administer medication as ordered  2. Teach and rehearse alternative coping skills  3. Provide emotional support with 1:1 interaction with staff  Outcome: Progressing     Problem: Drug Abuse/Detox  Goal: Will have no detox symptoms and will verbalize plan for changing drug-related behavior  Description: INTERVENTIONS:  1. Administer medication as ordered  2. Monitor physical status  3. Provide emotional support with 1:1 interaction with staff  4. Encourage  recovery focused treatment   Outcome: Progressing     Problem: Involuntary Admit  Goal: Will cooperate with staff recommendations and doctor's orders and will demonstrate appropriate behavior  Description: INTERVENTIONS:  1. Treat underlying conditions and offer medication as ordered  2. Educate regarding involuntary admission procedures and rules  3.  Contain excessive/inappropriate behavior per unit and hospital policies  Outcome: Progressing

## 2023-09-28 ENCOUNTER — HOSPITAL ENCOUNTER (INPATIENT)
Age: 38
LOS: 3 days | Discharge: ANOTHER HOSPITAL | DRG: 918 | End: 2023-10-01
Attending: EMERGENCY MEDICINE | Admitting: FAMILY MEDICINE
Payer: COMMERCIAL

## 2023-09-28 DIAGNOSIS — T50.902A SUICIDE ATTEMPT BY DRUG INGESTION, INITIAL ENCOUNTER (HCC): Primary | ICD-10-CM

## 2023-09-28 PROBLEM — T14.91XA SUICIDE ATTEMPT (HCC): Status: ACTIVE | Noted: 2023-09-28

## 2023-09-28 LAB
ALBUMIN SERPL-MCNC: 4 G/DL (ref 3.5–5.2)
ALP SERPL-CCNC: 82 U/L (ref 35–104)
ALT SERPL-CCNC: 12 U/L (ref 0–32)
AMPHET UR QL SCN: POSITIVE
ANION GAP SERPL CALCULATED.3IONS-SCNC: 12 MMOL/L (ref 7–16)
AST SERPL-CCNC: 18 U/L (ref 0–31)
BACTERIA URNS QL MICRO: ABNORMAL
BARBITURATES UR QL SCN: NEGATIVE
BASOPHILS # BLD: 0.02 K/UL (ref 0–0.2)
BASOPHILS NFR BLD: 0 % (ref 0–2)
BENZODIAZ UR QL: NEGATIVE
BILIRUB SERPL-MCNC: 0.2 MG/DL (ref 0–1.2)
BILIRUB UR QL STRIP: NEGATIVE
BUN SERPL-MCNC: 13 MG/DL (ref 6–20)
BUPRENORPHINE UR QL: NEGATIVE
CALCIUM SERPL-MCNC: 9 MG/DL (ref 8.6–10.2)
CANNABINOIDS UR QL SCN: POSITIVE
CHLORIDE SERPL-SCNC: 101 MMOL/L (ref 98–107)
CLARITY UR: CLEAR
CO2 SERPL-SCNC: 25 MMOL/L (ref 22–29)
COCAINE UR QL SCN: NEGATIVE
COLOR UR: YELLOW
CREAT SERPL-MCNC: 0.6 MG/DL (ref 0.5–1)
DATE LAST DOSE: 0
EOSINOPHIL # BLD: 0.03 K/UL (ref 0.05–0.5)
EOSINOPHILS RELATIVE PERCENT: 1 % (ref 0–6)
EPI CELLS #/AREA URNS HPF: ABNORMAL /HPF
ERYTHROCYTE [DISTWIDTH] IN BLOOD BY AUTOMATED COUNT: 12.4 % (ref 11.5–15)
FENTANYL UR QL: NEGATIVE
GFR SERPL CREATININE-BSD FRML MDRD: >60 ML/MIN/1.73M2
GLUCOSE BLD-MCNC: 147 MG/DL (ref 74–99)
GLUCOSE SERPL-MCNC: 162 MG/DL (ref 74–99)
GLUCOSE UR STRIP-MCNC: NEGATIVE MG/DL
HCG, URINE, POC: NEGATIVE
HCT VFR BLD AUTO: 39.8 % (ref 34–48)
HGB BLD-MCNC: 13.1 G/DL (ref 11.5–15.5)
HGB UR QL STRIP.AUTO: ABNORMAL
IMM GRANULOCYTES # BLD AUTO: <0.03 K/UL (ref 0–0.58)
IMM GRANULOCYTES NFR BLD: 0 % (ref 0–5)
KETONES UR STRIP-MCNC: ABNORMAL MG/DL
LEUKOCYTE ESTERASE UR QL STRIP: ABNORMAL
LYMPHOCYTES NFR BLD: 1.44 K/UL (ref 1.5–4)
LYMPHOCYTES RELATIVE PERCENT: 24 % (ref 20–42)
Lab: NORMAL
MAGNESIUM SERPL-MCNC: 1.7 MG/DL (ref 1.6–2.6)
MCH RBC QN AUTO: 29.8 PG (ref 26–35)
MCHC RBC AUTO-ENTMCNC: 32.9 G/DL (ref 32–34.5)
MCV RBC AUTO: 90.5 FL (ref 80–99.9)
METHADONE UR QL: NEGATIVE
MONOCYTES NFR BLD: 0.4 K/UL (ref 0.1–0.95)
MONOCYTES NFR BLD: 7 % (ref 2–12)
NEGATIVE QC PASS/FAIL: NORMAL
NEUTROPHILS NFR BLD: 68 % (ref 43–80)
NEUTS SEG NFR BLD: 4.11 K/UL (ref 1.8–7.3)
NITRITE UR QL STRIP: POSITIVE
OPIATES UR QL SCN: NEGATIVE
OXYCODONE UR QL SCN: NEGATIVE
PCP UR QL SCN: NEGATIVE
PH UR STRIP: 6 [PH] (ref 5–9)
PLATELET # BLD AUTO: 265 K/UL (ref 130–450)
PMV BLD AUTO: 10 FL (ref 7–12)
POSITIVE QC PASS/FAIL: NORMAL
POTASSIUM SERPL-SCNC: 4 MMOL/L (ref 3.5–5)
PROT SERPL-MCNC: 6.9 G/DL (ref 6.4–8.3)
PROT UR STRIP-MCNC: NEGATIVE MG/DL
RBC # BLD AUTO: 4.4 M/UL (ref 3.5–5.5)
RBC #/AREA URNS HPF: ABNORMAL /HPF
SARS-COV-2 RDRP RESP QL NAA+PROBE: NOT DETECTED
SODIUM SERPL-SCNC: 138 MMOL/L (ref 132–146)
SP GR UR STRIP: 1.02 (ref 1–1.03)
SPECIMEN DESCRIPTION: NORMAL
TEST INFORMATION: ABNORMAL
TME LAST DOSE: 0 H
UROBILINOGEN UR STRIP-ACNC: 0.2 EU/DL (ref 0–1)
VALPROATE SERPL-MCNC: 29 UG/ML (ref 50–100)
VANCOMYCIN DOSE: 0 MG
WBC #/AREA URNS HPF: ABNORMAL /HPF
WBC OTHER # BLD: 6 K/UL (ref 4.5–11.5)

## 2023-09-28 PROCEDURE — 99222 1ST HOSP IP/OBS MODERATE 55: CPT | Performed by: FAMILY MEDICINE

## 2023-09-28 PROCEDURE — 80164 ASSAY DIPROPYLACETIC ACD TOT: CPT

## 2023-09-28 PROCEDURE — G0480 DRUG TEST DEF 1-7 CLASSES: HCPCS

## 2023-09-28 PROCEDURE — 80179 DRUG ASSAY SALICYLATE: CPT

## 2023-09-28 PROCEDURE — 80053 COMPREHEN METABOLIC PANEL: CPT

## 2023-09-28 PROCEDURE — 93005 ELECTROCARDIOGRAM TRACING: CPT | Performed by: FAMILY MEDICINE

## 2023-09-28 PROCEDURE — P9612 CATHETERIZE FOR URINE SPEC: HCPCS

## 2023-09-28 PROCEDURE — 87635 SARS-COV-2 COVID-19 AMP PRB: CPT

## 2023-09-28 PROCEDURE — 80307 DRUG TEST PRSMV CHEM ANLYZR: CPT

## 2023-09-28 PROCEDURE — 82962 GLUCOSE BLOOD TEST: CPT

## 2023-09-28 PROCEDURE — 80143 DRUG ASSAY ACETAMINOPHEN: CPT

## 2023-09-28 PROCEDURE — 93005 ELECTROCARDIOGRAM TRACING: CPT | Performed by: STUDENT IN AN ORGANIZED HEALTH CARE EDUCATION/TRAINING PROGRAM

## 2023-09-28 PROCEDURE — 85025 COMPLETE CBC W/AUTO DIFF WBC: CPT

## 2023-09-28 PROCEDURE — 99285 EMERGENCY DEPT VISIT HI MDM: CPT

## 2023-09-28 PROCEDURE — 83735 ASSAY OF MAGNESIUM: CPT

## 2023-09-28 PROCEDURE — 96374 THER/PROPH/DIAG INJ IV PUSH: CPT

## 2023-09-28 PROCEDURE — 81001 URINALYSIS AUTO W/SCOPE: CPT

## 2023-09-28 PROCEDURE — 6360000002 HC RX W HCPCS: Performed by: STUDENT IN AN ORGANIZED HEALTH CARE EDUCATION/TRAINING PROGRAM

## 2023-09-28 PROCEDURE — 2580000003 HC RX 258: Performed by: EMERGENCY MEDICINE

## 2023-09-28 PROCEDURE — 2060000000 HC ICU INTERMEDIATE R&B

## 2023-09-28 RX ORDER — LORAZEPAM 2 MG/ML
2 INJECTION INTRAMUSCULAR ONCE
Status: COMPLETED | OUTPATIENT
Start: 2023-09-28 | End: 2023-09-28

## 2023-09-28 RX ORDER — SODIUM CHLORIDE 9 MG/ML
INJECTION, SOLUTION INTRAVENOUS PRN
Status: DISCONTINUED | OUTPATIENT
Start: 2023-09-28 | End: 2023-10-01 | Stop reason: HOSPADM

## 2023-09-28 RX ORDER — SODIUM CHLORIDE 0.9 % (FLUSH) 0.9 %
5-40 SYRINGE (ML) INJECTION EVERY 12 HOURS SCHEDULED
Status: DISCONTINUED | OUTPATIENT
Start: 2023-09-28 | End: 2023-10-01 | Stop reason: HOSPADM

## 2023-09-28 RX ORDER — SODIUM CHLORIDE 9 MG/ML
INJECTION, SOLUTION INTRAVENOUS CONTINUOUS
Status: DISCONTINUED | OUTPATIENT
Start: 2023-09-28 | End: 2023-10-01 | Stop reason: HOSPADM

## 2023-09-28 RX ORDER — ACETAMINOPHEN 325 MG/1
650 TABLET ORAL EVERY 6 HOURS PRN
Status: DISCONTINUED | OUTPATIENT
Start: 2023-09-28 | End: 2023-10-01 | Stop reason: HOSPADM

## 2023-09-28 RX ORDER — ENOXAPARIN SODIUM 100 MG/ML
40 INJECTION SUBCUTANEOUS DAILY
Status: DISCONTINUED | OUTPATIENT
Start: 2023-09-29 | End: 2023-10-01 | Stop reason: HOSPADM

## 2023-09-28 RX ORDER — 0.9 % SODIUM CHLORIDE 0.9 %
1000 INTRAVENOUS SOLUTION INTRAVENOUS ONCE
Status: COMPLETED | OUTPATIENT
Start: 2023-09-28 | End: 2023-09-29

## 2023-09-28 RX ORDER — ACETAMINOPHEN 650 MG/1
650 SUPPOSITORY RECTAL EVERY 6 HOURS PRN
Status: DISCONTINUED | OUTPATIENT
Start: 2023-09-28 | End: 2023-10-01 | Stop reason: HOSPADM

## 2023-09-28 RX ORDER — SODIUM CHLORIDE 0.9 % (FLUSH) 0.9 %
5-40 SYRINGE (ML) INJECTION PRN
Status: DISCONTINUED | OUTPATIENT
Start: 2023-09-28 | End: 2023-10-01 | Stop reason: HOSPADM

## 2023-09-28 RX ADMIN — SODIUM CHLORIDE 1000 ML: 9 INJECTION, SOLUTION INTRAVENOUS at 22:49

## 2023-09-28 RX ADMIN — LORAZEPAM 2 MG: 2 INJECTION INTRAMUSCULAR; INTRAVENOUS at 22:46

## 2023-09-28 ASSESSMENT — PAIN - FUNCTIONAL ASSESSMENT
PAIN_FUNCTIONAL_ASSESSMENT: ADULT NONVERBAL PAIN SCALE (NPVS)
PAIN_FUNCTIONAL_ASSESSMENT: NONE - DENIES PAIN

## 2023-09-28 NOTE — ED PROVIDER NOTES
[address] [address] PIC/ICU  EMERGENCY DEPARTMENT ENCOUNTER      Pt Name: Alex Pathak  MRN: [id number]  [address] [facility] 1985  Date of evaluation: 2023  Provider: Gale Chan DO  PCP: No primary care provider on file. Note Started: 7:31 PM EDT 23    CHIEF COMPLAINT       Chief Complaint   Patient presents with    Suicide Attempt     Pt. Took approx. 59 risperidone and approx. 22 benzotropine tonight in an attempt to kill themselves       HISTORY OF PRESENT ILLNESS: 1 or more Elements   History From: Patient  Limitations to history : Altered Mental Status    Alex Patahk is a 41 y.o. female who presents to the ED following a suicide attempt. Patient arrives to the ED following a suicide attempt at home. She took roughly 59 risperidone and 22 benzatropine with an attempt to kill self. Patient does have a prior suicide attempt in the past by overdose. She was recently admitted to the psychiatric floor for suicidal ideation. Patient is somnolent upon examination and unable to tell us her name and that she just wants to go to sleep. Notes that she took the medications in attempt to kill herself. Remainder of history is limited given patient's current mental status. Nursing Notes were all reviewed and agreed with or any disagreements were addressed in the HPI. REVIEW OF SYSTEMS :    Positives and Pertinent negatives as per HPI.      SURGICAL HISTORY     Past Surgical History:   Procedure Laterality Date     SECTION       SECTION      CHOLECYSTECTOMY      CHOLECYSTECTOMY, LAPAROSCOPIC N/A 10-10-13    HERNIA REPAIR      TUBAL LIGATION         CURRENTMEDICATIONS       Current Discharge Medication List        CONTINUE these medications which have NOT CHANGED    Details   benztropine (COGENTIN) 1 MG tablet Take 1 tablet by mouth 2 times daily  Qty: 60 tablet, Refills: 3      divalproex (DEPAKOTE) 500 MG DR tablet Take 1 tablet by mouth every 12 hours  Qty: 90 tablet, Refills: 3

## 2023-09-28 NOTE — ED NOTES
ER Clinical Pharmacy Note  (Overdose)      Medication ingested: Benztropine 1 mg (38 tablets remain from a 60 tablet fill 2 days ago on 9/26);  Risperidone 1 mg (1 tablet remains from a 60 tablet fill 2 days ago on 9/26)  Time of ingestion: 30 minutes PTA  Poison control contact: Krystal  Recommendations: Watch for CNS depression/hypotension/QTc prolongation; check e-lytes (including magnesium) and replace as needed; risk of seizures  Length of observation: Minimum of 6-8 hours if at baseline    NAREN Beltran LILLIAN [facility] PharmD, BCPS 9/28/2023 7:32 PM

## 2023-09-29 PROBLEM — N39.0 UTI (URINARY TRACT INFECTION): Status: ACTIVE | Noted: 2023-09-29

## 2023-09-29 PROBLEM — T50.902A SUICIDE ATTEMPT BY DRUG INGESTION (HCC): Status: ACTIVE | Noted: 2023-09-28

## 2023-09-29 LAB
ANION GAP SERPL CALCULATED.3IONS-SCNC: 9 MMOL/L (ref 7–16)
APAP SERPL-MCNC: <5 UG/ML (ref 10–30)
BUN SERPL-MCNC: 12 MG/DL (ref 6–20)
CALCIUM SERPL-MCNC: 8.5 MG/DL (ref 8.6–10.2)
CHLORIDE SERPL-SCNC: 107 MMOL/L (ref 98–107)
CO2 SERPL-SCNC: 26 MMOL/L (ref 22–29)
CREAT SERPL-MCNC: 0.6 MG/DL (ref 0.5–1)
EKG ATRIAL RATE: 113 BPM
EKG ATRIAL RATE: 125 BPM
EKG P AXIS: 41 DEGREES
EKG P AXIS: 56 DEGREES
EKG P-R INTERVAL: 140 MS
EKG P-R INTERVAL: 146 MS
EKG Q-T INTERVAL: 318 MS
EKG Q-T INTERVAL: 358 MS
EKG QRS DURATION: 82 MS
EKG QRS DURATION: 90 MS
EKG QTC CALCULATION (BAZETT): 458 MS
EKG QTC CALCULATION (BAZETT): 491 MS
EKG R AXIS: 76 DEGREES
EKG R AXIS: 80 DEGREES
EKG T AXIS: 38 DEGREES
EKG T AXIS: 46 DEGREES
EKG VENTRICULAR RATE: 113 BPM
EKG VENTRICULAR RATE: 125 BPM
ETHANOLAMINE SERPL-MCNC: <10 MG/DL
GFR SERPL CREATININE-BSD FRML MDRD: >60 ML/MIN/1.73M2
GLUCOSE SERPL-MCNC: 107 MG/DL (ref 74–99)
MAGNESIUM SERPL-MCNC: 1.7 MG/DL (ref 1.6–2.6)
POTASSIUM SERPL-SCNC: 3.6 MMOL/L (ref 3.5–5)
SALICYLATES SERPL-MCNC: <0.3 MG/DL (ref 0–30)
SODIUM SERPL-SCNC: 142 MMOL/L (ref 132–146)
TOXIC TRICYCLIC SC,BLOOD: NEGATIVE

## 2023-09-29 PROCEDURE — 93010 ELECTROCARDIOGRAM REPORT: CPT | Performed by: INTERNAL MEDICINE

## 2023-09-29 PROCEDURE — 2060000000 HC ICU INTERMEDIATE R&B

## 2023-09-29 PROCEDURE — 36415 COLL VENOUS BLD VENIPUNCTURE: CPT

## 2023-09-29 PROCEDURE — 2580000003 HC RX 258: Performed by: STUDENT IN AN ORGANIZED HEALTH CARE EDUCATION/TRAINING PROGRAM

## 2023-09-29 PROCEDURE — 90792 PSYCH DIAG EVAL W/MED SRVCS: CPT

## 2023-09-29 PROCEDURE — 2580000003 HC RX 258: Performed by: FAMILY MEDICINE

## 2023-09-29 PROCEDURE — 83735 ASSAY OF MAGNESIUM: CPT

## 2023-09-29 PROCEDURE — 87086 URINE CULTURE/COLONY COUNT: CPT

## 2023-09-29 PROCEDURE — 99232 SBSQ HOSP IP/OBS MODERATE 35: CPT | Performed by: INTERNAL MEDICINE

## 2023-09-29 PROCEDURE — 80048 BASIC METABOLIC PNL TOTAL CA: CPT

## 2023-09-29 PROCEDURE — 6360000002 HC RX W HCPCS: Performed by: STUDENT IN AN ORGANIZED HEALTH CARE EDUCATION/TRAINING PROGRAM

## 2023-09-29 PROCEDURE — APPSS30 APP SPLIT SHARED TIME 16-30 MINUTES: Performed by: NURSE PRACTITIONER

## 2023-09-29 PROCEDURE — 6360000002 HC RX W HCPCS: Performed by: FAMILY MEDICINE

## 2023-09-29 RX ADMIN — SODIUM CHLORIDE: 9 INJECTION, SOLUTION INTRAVENOUS at 02:11

## 2023-09-29 RX ADMIN — WATER 1000 MG: 1 INJECTION INTRAMUSCULAR; INTRAVENOUS; SUBCUTANEOUS at 00:35

## 2023-09-29 RX ADMIN — ENOXAPARIN SODIUM 40 MG: 100 INJECTION SUBCUTANEOUS at 08:30

## 2023-09-29 RX ADMIN — Medication 10 ML: at 08:30

## 2023-09-29 NOTE — PROGRESS NOTES
4 Eyes Skin Assessment     NAME:  Leighann Willoughby  YOB: 1985  MEDICAL RECORD NUMBER:  [id number]    The patient is being assessed for  Admission    I agree that at least one RN has performed a thorough Head to Toe Skin Assessment on the patient. ALL assessment sites listed below have been assessed. Areas assessed by both nurses:    Head, Face, Ears, Shoulders, Back, Chest, Arms, Elbows, Hands, Sacrum. Buttock, Coccyx, Ischium, and Legs. Feet and Heels        Does the Patient have a Wound?  No noted wound(s)       Jayant Prevention initiated by RN: Yes  Wound Care Orders initiated by RN: No    Pressure Injury (Stage 3,4, Unstageable, DTI, NWPT, and Complex wounds) if present, place Wound referral order by RN under : No    New Ostomies, if present place, Ostomy referral order under : Yes     Nurse 1 eSignature: Electronically signed by Muriel Morton RN on 9/29/23 at 7:26 PM EDT    **SHARE this note so that the co-signing nurse can place an eSignature**    Nurse 2 eSignature: {Esignature:[id number]}

## 2023-09-29 NOTE — ED NOTES
Pt. Placed in paper gown, ligature risks removed from room, pt. cellphone, keys, medications, and clothing locked in locker #2.      Arron Awad RN  09/28/23 2004

## 2023-09-29 NOTE — ED NOTES
Assumed care of pt at this time; constant observer at bedside; vitals set to cycle; patient resting eyes closed difficult to arouse; will monitor     Merryl Bamberger, RN  09/28/23 2127

## 2023-09-29 NOTE — ACP (ADVANCE CARE PLANNING)
Advance Care Planning   Healthcare Decision Maker:    Primary Decision Maker: Howard Gomez - [facility] - [phone]      Today we documented Decision Maker(s) consistent with Legal Next of Kin hierarchy.

## 2023-09-29 NOTE — ED NOTES
Patient became tachy in 80s physician notified; patient asymptomatic resting eyes closed.      Jonathon Cazares RN  09/28/23 4158

## 2023-09-29 NOTE — ED NOTES
Taken to 535-1 in stable condition on monitor by nurse with all personal belongings and pink slip     Robina Ramirez RN  09/29/23 2460

## 2023-09-29 NOTE — PROGRESS NOTES
Please consult Dr. Otilio Childs for psychiatric consults at [facility]. The patient requesting test results, urogram.    Please advise

## 2023-09-29 NOTE — PLAN OF CARE
Problem: Discharge Planning  Goal: Discharge to home or other facility with appropriate resources  Outcome: Progressing     Problem: Pain  Goal: Verbalizes/displays adequate comfort level or baseline comfort level  Outcome: Progressing     Problem: Skin/Tissue Integrity  Goal: Absence of new skin breakdown  Description: 1. Monitor for areas of redness and/or skin breakdown  2. Assess vascular access sites hourly  3. Every 4-6 hours minimum:  Change oxygen saturation probe site  4. Every 4-6 hours:  If on nasal continuous positive airway pressure, respiratory therapy assess nares and determine need for appliance change or resting period.   Outcome: Progressing     Problem: Risk for Elopement  Goal: Patient will not exit the unit/facility without proper excort  Outcome: Progressing  Flowsheets (Taken 9/28/2023 2107 by Spencer Kennedy RN)  Nursing Interventions for Elopement Risk:   Assist with personal care needs such as toileting, eating, dressing, as needed to reduce the risk of wandering   Collaborate with family members/caregivers to mitigate the elopement risk   Collaborate with treatment team for drug withdrawal symptoms treatment   Collaborate with treatment team for nicotine replacement   Communicate/escalate to charge nurse the risk of elopement   Communicate/escalate to /other team member the risk of elopement   Communicate/escalate to nursing supervisor the risk of elopement   Communicate to physician the risk for elopement   Escort with two staff members if patient must leave the unit   Make sure patient has all necessary personal care items   Place patient in room far away from exits and [address] and clothing collected and placed in gown attire   Reduce environmental triggers     Problem: Safety - Adult  Goal: Free from fall injury  Outcome: Progressing
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
## 2023-09-29 NOTE — CARE COORDINATION
Case Management Assessment  Initial Evaluation    Date/Time of Evaluation: 9/29/2023 2:58 PM  Assessment Completed by: Nadine Morejon RN    If patient is discharged prior to next notation, then this note serves as note for discharge by case management. Patient Name: Alex Pathak                   YOB: 1985  Diagnosis: Suicide attempt [facility]                   Date / Time: 9/28/2023  7:23 PM    Patient Admission Status: Inpatient   Readmission Risk (Low < 19, Mod (19-27), High > 27): Readmission Risk Score: 13.8    Current PCP: No primary care provider on file. PCP verified by CM?  No    Chart Reviewed: Yes      History Provided by: Medical Record  Patient Orientation: Sedated    Patient Cognition: Other (see comment) (talking nonsensical)    Hospitalization in the last 30 days (Readmission):  Yes    Readmission Assessment  Number of Days since last admission?: 1-7 days  Previous Disposition: Home Alone (pr chart review)  Who is being Interviewed:  (pt unable to answer questions  at this time)  What was the patient's/caregiver's perception as to why they think they needed to return back to the hospital?: Other (Comment) (pt unable to answer questions at this time)  Did you visit your Primary Care Physician after you left the hospital, before you returned this time?: No (unknown)  Why weren't you able to visit your PCP?: Other (Comment) (unknown)  Did you see a specialist, such as Cardiac, Pulmonary, Orthopedic Physician, etc. after you left the hospital?: Other (Comment) (unknown)  Who advised the patient to return to the hospital?: Other (Comment) (unknown)  Does the patient report anything that got in the way of taking their medications?: No (unknown)  In our efforts to provide the best possible care to you and others like you, can you think of anything that we could have done to help you after you left the hospital the first time, so that you might not have needed to return so soon?:

## 2023-09-29 NOTE — H&P
[facility]   History and Physical      CHIEF COMPLAINT:  suicide attempt    History of Present Illness:  41 y.o. female with a history of depression, suicide attempt, HTN presents with intentional overdose. Initially on arrival to ED patient was answering some questions, admitted to 22 benzotropine and 61 risperdal.  Poison control recommended monitoring for CNS depression, hypotension, QTc prolongation, and electrolyte derangement. Workup in ED significant for glucose 162. UDS positive for amphetamines, cannabinoids. Valproic acid level 29, salicylate level <0.2.  UA nitrite and leukocyte esterase positive. EKG sinus tachycardia. Pink slipped by ED. Given Rocephin and ativan in ED. Patient admitted to psych floor on  for hallucinations. Informant(s) for H&P: chart    REVIEW OF SYSTEMS:  Complete ROS unable to be performed with the patient and is otherwise negative. PMH:  Past Medical History:   Diagnosis Date    H/O chronic bronchitis     most recent in 2013    HTN (hypertension)     Knee strain, right, subsequent encounter 2020    Mental disorder     Migraine     Pregnancy     EDC 2012    Sciatica        Surgical History:  Past Surgical History:   Procedure Laterality Date     SECTION       SECTION      CHOLECYSTECTOMY      CHOLECYSTECTOMY, LAPAROSCOPIC N/A 10-10-    HERNIA REPAIR      TUBAL LIGATION         Medications Prior to Admission:    Prior to Admission medications    Medication Sig Start Date End Date Taking?  Authorizing Provider   benztropine (COGENTIN) 1 MG tablet Take 1 tablet by mouth 2 times daily    DONALD Polanco CNP   divalproex (DEPAKOTE) 500 MG DR tablet Take 1 tablet by mouth every 12 hours 54   DONALD Polanco CNP   risperiDONE (RISPERDAL) 1 MG tablet Take 1 tablet by mouth 2 times daily 23/38/35  DONALD Brian CNP   nicotine (Bran Piano) 21 hospital problems. *      PLAN:    Suicide attempt  Polysubstance abuse  - s/w consult  - 1 on 1 observation, suicide precautions  - needs medically cleared for psych  - hold home psych meds due to overdose on them  - IVF  - trend electrolytes  - monitor telemetry    UTI  - continue Rocephin  - f/u urine culture    Code Status: full  DVT prophylaxis: Lovenox    NOTE: This report was transcribed using voice recognition software. Every effort was made to ensure accuracy; however, inadvertent computerized transcription errors may be present.      Electronically signed by Cody العراقي DO on 9/28/2023 at 11:12 PM

## 2023-09-29 NOTE — ED NOTES
Updated Nasreen Ybarra from [facility] with labs, vitals, and patient condition      Gladys Salazar RN  09/29/23 7503

## 2023-09-30 PROBLEM — T14.91XA SUICIDE ATTEMPT (HCC): Status: ACTIVE | Noted: 2023-09-30

## 2023-09-30 LAB
ALBUMIN SERPL-MCNC: 3.1 G/DL (ref 3.5–5.2)
ALP SERPL-CCNC: 62 U/L (ref 35–104)
ALT SERPL-CCNC: 8 U/L (ref 0–32)
ANION GAP SERPL CALCULATED.3IONS-SCNC: 9 MMOL/L (ref 7–16)
AST SERPL-CCNC: 10 U/L (ref 0–31)
BILIRUB SERPL-MCNC: 0.2 MG/DL (ref 0–1.2)
BUN SERPL-MCNC: 12 MG/DL (ref 6–20)
CALCIUM SERPL-MCNC: 8.1 MG/DL (ref 8.6–10.2)
CHLORIDE SERPL-SCNC: 108 MMOL/L (ref 98–107)
CO2 SERPL-SCNC: 23 MMOL/L (ref 22–29)
CREAT SERPL-MCNC: 0.7 MG/DL (ref 0.5–1)
EKG ATRIAL RATE: 78 BPM
EKG P AXIS: 37 DEGREES
EKG P-R INTERVAL: 152 MS
EKG Q-T INTERVAL: 394 MS
EKG QRS DURATION: 90 MS
EKG QTC CALCULATION (BAZETT): 449 MS
EKG R AXIS: 81 DEGREES
EKG T AXIS: 52 DEGREES
EKG VENTRICULAR RATE: 78 BPM
GFR SERPL CREATININE-BSD FRML MDRD: >60 ML/MIN/1.73M2
GLUCOSE SERPL-MCNC: 86 MG/DL (ref 74–99)
MAGNESIUM SERPL-MCNC: 1.7 MG/DL (ref 1.6–2.6)
MICROORGANISM SPEC CULT: ABNORMAL
MICROORGANISM SPEC CULT: ABNORMAL
PHOSPHATE SERPL-MCNC: 3.4 MG/DL (ref 2.5–4.5)
POTASSIUM SERPL-SCNC: 3.7 MMOL/L (ref 3.5–5)
PROT SERPL-MCNC: 5.3 G/DL (ref 6.4–8.3)
SODIUM SERPL-SCNC: 140 MMOL/L (ref 132–146)
SPECIMEN DESCRIPTION: ABNORMAL

## 2023-09-30 PROCEDURE — 93005 ELECTROCARDIOGRAM TRACING: CPT | Performed by: NURSE PRACTITIONER

## 2023-09-30 PROCEDURE — 93010 ELECTROCARDIOGRAM REPORT: CPT | Performed by: INTERNAL MEDICINE

## 2023-09-30 PROCEDURE — 84100 ASSAY OF PHOSPHORUS: CPT

## 2023-09-30 PROCEDURE — 2580000003 HC RX 258: Performed by: FAMILY MEDICINE

## 2023-09-30 PROCEDURE — APPSS45 APP SPLIT SHARED TIME 31-45 MINUTES: Performed by: NURSE PRACTITIONER

## 2023-09-30 PROCEDURE — 2060000000 HC ICU INTERMEDIATE R&B

## 2023-09-30 PROCEDURE — 93005 ELECTROCARDIOGRAM TRACING: CPT | Performed by: INTERNAL MEDICINE

## 2023-09-30 PROCEDURE — 80053 COMPREHEN METABOLIC PANEL: CPT

## 2023-09-30 PROCEDURE — 99239 HOSP IP/OBS DSCHRG MGMT >30: CPT | Performed by: INTERNAL MEDICINE

## 2023-09-30 PROCEDURE — 36415 COLL VENOUS BLD VENIPUNCTURE: CPT

## 2023-09-30 PROCEDURE — 83735 ASSAY OF MAGNESIUM: CPT

## 2023-09-30 PROCEDURE — 6360000002 HC RX W HCPCS: Performed by: FAMILY MEDICINE

## 2023-09-30 RX ORDER — CEFDINIR 300 MG/1
300 CAPSULE ORAL EVERY 12 HOURS SCHEDULED
Status: DISCONTINUED | OUTPATIENT
Start: 2023-10-01 | End: 2023-09-30

## 2023-09-30 RX ADMIN — Medication 10 ML: at 21:53

## 2023-09-30 RX ADMIN — ENOXAPARIN SODIUM 40 MG: 100 INJECTION SUBCUTANEOUS at 09:00

## 2023-09-30 RX ADMIN — Medication 10 ML: at 09:39

## 2023-09-30 RX ADMIN — CEFTRIAXONE 1000 MG: 1 INJECTION, POWDER, FOR SOLUTION INTRAMUSCULAR; INTRAVENOUS at 04:52

## 2023-09-30 RX ADMIN — SODIUM CHLORIDE: 9 INJECTION, SOLUTION INTRAVENOUS at 04:49

## 2023-09-30 NOTE — ED NOTES
The pt was accepted to [facility] room 7423. Disposition called to [facility] in admitting. Accepting info given to Deandra on the floor.      Extension Arianne Cope, [facility]  09/30/23 8285

## 2023-09-30 NOTE — PROGRESS NOTES
Attempted to give N2N with the phone number Denise the  from [facility]. E's provided. I was then told to perfect serve Shanique Simms NP. Left  for her and provided callback number.

## 2023-09-30 NOTE — PROGRESS NOTES
Patient continue to be lethargic with episodes of wakefulness. Patent refused 2100 IV medication, it was explained medication would be given slowly through IV, both IV site WNL. Patient states medication burn. Again this nurse explained IVs are in good condition and flush well and medication would be given very slowly. Patient still refused. I told patient I would try later. 0010, patient sleeping, attempted to awaken patient to give IV medication. Patient only aroused for brief moment then returned to sleep. Will try to give medication in am, then retime medication.

## 2023-09-30 NOTE — ED NOTES
CYNTHIA RESENDEZ has been notified that this patient is ready to be admitted to behavioral health on [facility] by Karen Baird RN at [employer]. CYNTHIA RESENDEZ has requested the front and back of pink slip be faxed to [phone] which has been received, thank you. Please call N2N to [phone]    Please do not set up transportation for this patient until you hear back from a CYNTHIA RESENDEZ. If you have any questions please contact [facility] at [phone].     Thank you,    CLAUDIA Black, [place]  09/30/23 8642

## 2023-09-30 NOTE — PROGRESS NOTES
Adam Mcnamara NP, verbalized patient is medically cleared. Called CYNTHIA at [facility]. E's for bed availability . No answer. Left VM. Will follow up with social work and access center for the need of inpatient psych tranfer.

## 2023-09-30 NOTE — PROGRESS NOTES
Spoke with Denise patterson at [facility] ER. Faxed over copy of pink slip at this time.

## 2023-10-01 ENCOUNTER — HOSPITAL ENCOUNTER (INPATIENT)
Age: 38
LOS: 5 days | Discharge: OTHER INSTITUTION | End: 2023-10-06
Attending: PSYCHIATRY & NEUROLOGY | Admitting: PSYCHIATRY & NEUROLOGY
Payer: COMMERCIAL

## 2023-10-01 VITALS
HEIGHT: 66 IN | BODY MASS INDEX: 29.41 KG/M2 | SYSTOLIC BLOOD PRESSURE: 126 MMHG | OXYGEN SATURATION: 97 % | WEIGHT: 183 LBS | RESPIRATION RATE: 18 BRPM | TEMPERATURE: 97.8 F | DIASTOLIC BLOOD PRESSURE: 74 MMHG | HEART RATE: 63 BPM

## 2023-10-01 PROBLEM — F31.9 BIPOLAR 1 DISORDER (HCC): Status: RESOLVED | Noted: 2023-10-01 | Resolved: 2023-10-01

## 2023-10-01 PROBLEM — F31.9 BIPOLAR 1 DISORDER (HCC): Status: ACTIVE | Noted: 2023-10-01

## 2023-10-01 PROBLEM — F31.4 BIPOLAR DISORDER, CURRENT EPISODE DEPRESSED, SEVERE (HCC): Status: ACTIVE | Noted: 2023-10-01

## 2023-10-01 LAB
EKG ATRIAL RATE: 74 BPM
EKG P AXIS: 44 DEGREES
EKG P-R INTERVAL: 166 MS
EKG Q-T INTERVAL: 394 MS
EKG QRS DURATION: 92 MS
EKG QTC CALCULATION (BAZETT): 437 MS
EKG R AXIS: 62 DEGREES
EKG T AXIS: 47 DEGREES
EKG VENTRICULAR RATE: 74 BPM

## 2023-10-01 PROCEDURE — 6370000000 HC RX 637 (ALT 250 FOR IP): Performed by: PSYCHIATRY & NEUROLOGY

## 2023-10-01 PROCEDURE — 1240000000 HC EMOTIONAL WELLNESS R&B

## 2023-10-01 PROCEDURE — 90792 PSYCH DIAG EVAL W/MED SRVCS: CPT | Performed by: NURSE PRACTITIONER

## 2023-10-01 PROCEDURE — 6370000000 HC RX 637 (ALT 250 FOR IP): Performed by: NURSE PRACTITIONER

## 2023-10-01 PROCEDURE — 93010 ELECTROCARDIOGRAM REPORT: CPT | Performed by: INTERNAL MEDICINE

## 2023-10-01 RX ORDER — HYDROXYZINE PAMOATE 25 MG/1
50 CAPSULE ORAL 3 TIMES DAILY PRN
Status: DISCONTINUED | OUTPATIENT
Start: 2023-10-01 | End: 2023-10-06 | Stop reason: HOSPADM

## 2023-10-01 RX ORDER — NICOTINE 21 MG/24HR
1 PATCH, TRANSDERMAL 24 HOURS TRANSDERMAL DAILY
Status: DISCONTINUED | OUTPATIENT
Start: 2023-10-01 | End: 2023-10-01

## 2023-10-01 RX ORDER — ACETAMINOPHEN 325 MG/1
650 TABLET ORAL EVERY 6 HOURS PRN
Status: DISCONTINUED | OUTPATIENT
Start: 2023-10-01 | End: 2023-10-06 | Stop reason: HOSPADM

## 2023-10-01 RX ORDER — HALOPERIDOL 5 MG/1
5 TABLET ORAL EVERY 6 HOURS PRN
Status: DISCONTINUED | OUTPATIENT
Start: 2023-10-01 | End: 2023-10-06 | Stop reason: HOSPADM

## 2023-10-01 RX ORDER — HALOPERIDOL 5 MG/ML
5 INJECTION INTRAMUSCULAR EVERY 6 HOURS PRN
Status: DISCONTINUED | OUTPATIENT
Start: 2023-10-01 | End: 2023-10-06 | Stop reason: HOSPADM

## 2023-10-01 RX ORDER — LANOLIN ALCOHOL/MO/W.PET/CERES
3 CREAM (GRAM) TOPICAL NIGHTLY PRN
Status: DISCONTINUED | OUTPATIENT
Start: 2023-10-01 | End: 2023-10-06 | Stop reason: HOSPADM

## 2023-10-01 RX ORDER — OXCARBAZEPINE 150 MG/1
150 TABLET, FILM COATED ORAL 2 TIMES DAILY
Status: COMPLETED | OUTPATIENT
Start: 2023-10-01 | End: 2023-10-01

## 2023-10-01 RX ORDER — OXCARBAZEPINE 300 MG/1
300 TABLET, FILM COATED ORAL 2 TIMES DAILY
Status: DISCONTINUED | OUTPATIENT
Start: 2023-10-02 | End: 2023-10-06 | Stop reason: HOSPADM

## 2023-10-01 RX ORDER — ARIPIPRAZOLE 5 MG/1
5 TABLET ORAL DAILY
Status: DISCONTINUED | OUTPATIENT
Start: 2023-10-01 | End: 2023-10-03

## 2023-10-01 RX ORDER — MAGNESIUM HYDROXIDE/ALUMINUM HYDROXICE/SIMETHICONE 120; 1200; 1200 MG/30ML; MG/30ML; MG/30ML
30 SUSPENSION ORAL PRN
Status: DISCONTINUED | OUTPATIENT
Start: 2023-10-01 | End: 2023-10-06 | Stop reason: HOSPADM

## 2023-10-01 RX ORDER — POLYETHYLENE GLYCOL 3350 17 G
2 POWDER IN PACKET (EA) ORAL
Status: DISCONTINUED | OUTPATIENT
Start: 2023-10-01 | End: 2023-10-06 | Stop reason: HOSPADM

## 2023-10-01 RX ADMIN — ARIPIPRAZOLE 5 MG: 5 TABLET ORAL at 12:25

## 2023-10-01 RX ADMIN — MELATONIN 3 MG ORAL TABLET 3 MG: 3 TABLET ORAL at 21:05

## 2023-10-01 RX ADMIN — HYDROXYZINE PAMOATE 50 MG: 50 CAPSULE ORAL at 15:23

## 2023-10-01 RX ADMIN — OXCARBAZEPINE 150 MG: 150 TABLET, FILM COATED ORAL at 12:25

## 2023-10-01 RX ADMIN — OXCARBAZEPINE 150 MG: 150 TABLET, FILM COATED ORAL at 21:05

## 2023-10-01 ASSESSMENT — PATIENT HEALTH QUESTIONNAIRE - PHQ9
SUM OF ALL RESPONSES TO PHQ QUESTIONS 1-9: 18
2. FEELING DOWN, DEPRESSED OR HOPELESS: 3
10. IF YOU CHECKED OFF ANY PROBLEMS, HOW DIFFICULT HAVE THESE PROBLEMS MADE IT FOR YOU TO DO YOUR WORK, TAKE CARE OF THINGS AT HOME, OR GET ALONG WITH OTHER PEOPLE: 2
3. TROUBLE FALLING OR STAYING ASLEEP: 3
6. FEELING BAD ABOUT YOURSELF - OR THAT YOU ARE A FAILURE OR HAVE LET YOURSELF OR YOUR FAMILY DOWN: 3
SUM OF ALL RESPONSES TO PHQ QUESTIONS 1-9: 18
SUM OF ALL RESPONSES TO PHQ9 QUESTIONS 1 & 2: 6
5. POOR APPETITE OR OVEREATING: 3
SUM OF ALL RESPONSES TO PHQ QUESTIONS 1-9: 18
8. MOVING OR SPEAKING SO SLOWLY THAT OTHER PEOPLE COULD HAVE NOTICED. OR THE OPPOSITE, BEING SO FIGETY OR RESTLESS THAT YOU HAVE BEEN MOVING AROUND A LOT MORE THAN USUAL: 0
4. FEELING TIRED OR HAVING LITTLE ENERGY: 3
9. THOUGHTS THAT YOU WOULD BE BETTER OFF DEAD, OR OF HURTING YOURSELF: 0
1. LITTLE INTEREST OR PLEASURE IN DOING THINGS: 3
SUM OF ALL RESPONSES TO PHQ QUESTIONS 1-9: 18
7. TROUBLE CONCENTRATING ON THINGS, SUCH AS READING THE NEWSPAPER OR WATCHING TELEVISION: 0

## 2023-10-01 ASSESSMENT — SLEEP AND FATIGUE QUESTIONNAIRES
SLEEP PATTERN: RESTLESSNESS
DO YOU HAVE DIFFICULTY SLEEPING: YES
SLEEP PATTERN: RESTLESSNESS
AVERAGE NUMBER OF SLEEP HOURS: 8
DO YOU USE A SLEEP AID: COMMENT
DO YOU HAVE DIFFICULTY SLEEPING: YES
AVERAGE NUMBER OF SLEEP HOURS: 8
DO YOU USE A SLEEP AID: NO

## 2023-10-01 ASSESSMENT — LIFESTYLE VARIABLES
HOW MANY STANDARD DRINKS CONTAINING ALCOHOL DO YOU HAVE ON A TYPICAL DAY: 1 OR 2
HOW OFTEN DO YOU HAVE A DRINK CONTAINING ALCOHOL: MONTHLY OR LESS
HOW OFTEN DO YOU HAVE A DRINK CONTAINING ALCOHOL: MONTHLY OR LESS
HOW MANY STANDARD DRINKS CONTAINING ALCOHOL DO YOU HAVE ON A TYPICAL DAY: 1 OR 2
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
## 2023-10-01 NOTE — GROUP NOTE
Group Therapy Note    Date: 10/1/2023    Group Start Time: 1010  Group End Time: 1050  Group Topic: Psychoeducation    [facility] [address], [place]                                                                        Group Therapy Note    Date: 10/1/2023      Module Name:  my wellness plan     Patient's Goal:  patient will be able to share what his/her routine looks like when they are well. Notes:  patient pleasant and sharing in group, able to commit to one step to imitate of his/her routine that keeps him/her well. Status After Intervention:  Improved    Participation Level:  Active Listener and Interactive    Participation Quality: Appropriate, Attentive, and Sharing      Speech:  normal      Thought Process/Content: Logical      Affective Functioning: Congruent      Mood: euthymic      Level of consciousness:  Alert, Oriented x4, and Attentive      Response to Learning: Able to verbalize/acknowledge new learning, Able to retain information, and Progressing to goal      Endings: None Reported    Modes of Intervention: Education, Support, Socialization, and Problem-solving      Discipline Responsible: Psychoeducational Specialist      Signature:  Mary Jane Wright

## 2023-10-01 NOTE — PROGRESS NOTES
Attended afternoon recreation activity. Patient coloring and watching football with others  Engaged in activity and socializing with others.

## 2023-10-01 NOTE — PLAN OF CARE
Problem: Discharge Planning  Goal: Discharge to home or other facility with appropriate resources  Outcome: Progressing  Flowsheets (Taken 9/30/2023 2000)  Discharge to home or other facility with appropriate resources: Identify barriers to discharge with patient and caregiver     Problem: Pain  Goal: Verbalizes/displays adequate comfort level or baseline comfort level  Outcome: Progressing     Problem: Skin/Tissue Integrity  Goal: Absence of new skin breakdown  Description: 1. Monitor for areas of redness and/or skin breakdown  2. Assess vascular access sites hourly  3. Every 4-6 hours minimum:  Change oxygen saturation probe site  4. Every 4-6 hours:  If on nasal continuous positive airway pressure, respiratory therapy assess nares and determine need for appliance change or resting period.   Outcome: Progressing     Problem: Risk for Elopement  Goal: Patient will not exit the unit/facility without proper excort  Outcome: Progressing     Problem: Safety - Adult  Goal: Free from fall injury  Outcome: Progressing     Problem: ABCDS Injury Assessment  Goal: Absence of physical injury  Outcome: Progressing

## 2023-10-01 NOTE — PROGRESS NOTES
Patient attended morning community meeting. Updated on staffing assignments and daily expectations. Shared goal for the day as to stay out of my own head.

## 2023-10-01 NOTE — GROUP NOTE
Group Therapy Note    Date: 10/1/2023    Group Start Time: 1055  Group End Time: 1125  Group Topic: Cognitive Skills    [facility]    CLAUDIA Hill LSW        Group Therapy Note    Attendees: 9       Patient's Goal:  Pt will be able to verbalize the understanding of self-care and it's importance. Notes:  Pt made connections and participated in group. Status After Intervention:  Improved    Participation Level:  Active Listener and Interactive    Participation Quality: Appropriate, Attentive, Sharing, and Supportive      Speech:  normal      Thought Process/Content: Logical  Linear      Affective Functioning: Congruent      Mood: euthymic      Level of consciousness:  Alert, Oriented x4, and Attentive      Response to Learning: Able to verbalize current knowledge/experience      Endings: None Reported    Modes of Intervention: Education, Support, Socialization, and Exploration      Discipline Responsible: /Counselor      Signature:  CLAUDIA Hill LSW

## 2023-10-01 NOTE — H&P
They have the following  Risk Factors: relationship issues, multiple past suicide attempts, recent suicide attempt, and multiple past psych admissions. Protective Factors: some support from family and active outpatient. Collateral Information:  Will obtain collateral information from the family or friends. Will obtain medical records as appropriate from out patient providers  Will consult the hospitalist for a physical exam to rule out any co-morbid physical condition. Home medication Reconciled   Reviewed continued as clinically indicated    New Medications started during this admission :    Abilify 5 mg daily will increase as clinically indicated  Trileptal 150 mg twice daily for stabilization of mood and will increase as clinically indicated    Prn Haldol 5mg and Vistaril 50mg q6hr for extreme agitation. Trazodone as ordered for insomnia  Vistaril as ordered for anxiety      Psychotherapy:   Encourage participation in milieu and group therapy  Individual therapy as needed    NOTE: This report was transcribed using voice recognition software. Every effort was made to ensure accuracy; however, inadvertent computerized transcription errors may be present. Behavioral Services  Medicare Certification Upon Admission    I certify that this patient's inpatient psychiatric hospital admission is medically necessary for:    [x] (1) Treatment which could reasonably be expected to improve this patient's condition,       [ ] (2) Or for diagnostic study;     AND     [x](2) The inpatient psychiatric services are provided while the individual is under the care of a physician and are included in the individualized plan of care.     Estimated length of stay/service 3 to 7 days based on stability    Plan for post-hospital care outpatient psychiatric and counseling services      Electronically signed by DONALD Ojeda CNP on 10/1/2023 at 8:51 AM

## 2023-10-01 NOTE — PLAN OF CARE
Problem: Self Harm/Suicidality  Goal: Will have no self-injury during hospital stay  Description: INTERVENTIONS:  1. Ensure constant observer at bedside with Q15M safety checks  2. Maintain a safe environment  3. Secure patient belongings  4. Ensure family/visitors adhere to safety recommendations  5. Ensure safety tray has been added to patient's diet order  6. Every shift and PRN: Re-assess suicidal risk via Frequent Screener    Outcome: Progressing     Problem: Depression  Goal: Will be euthymic at discharge  Description: INTERVENTIONS:  1. Administer medication as ordered  2. Provide emotional support via 1:1 interaction with staff  3. Encourage involvement in milieu/groups/activities  4. Monitor for social isolation  Outcome: Progressing     Problem: Behavior  Goal: Pt/Family maintain appropriate behavior and adhere to behavioral management agreement, if implemented  Description: INTERVENTIONS:  1. Assess patient/family's coping skills and  non-compliant behavior (including use of illegal substances)  2. Notify security of behavior or suspected illegal substances which indicate the need for search of the family and/or belongings  3. Encourage verbalization of thoughts and concerns in a socially appropriate manner  4. Utilize positive, consistent limit setting strategies supporting safety of patient, staff and others  5. Encourage participation in the decision making process about the behavioral management agreement  6. If a visitor's behavior poses a threat to safety call refer to organization policy. 7. Initiate consult with , Psychosocial CNS, Spiritual Care as appropriate  Outcome: Progressing     Pt denies SI, HI and AVH. Pt stated depression and anxiety were high. Pt out on the unit coloring with peers. Pt tearful up seeing this nurse. Pt spoke about regretting the OD and just wanting her kids back. \"I have to change for them I have to do it. \" Pt stated she needs inpt rehab and
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
## 2023-10-01 NOTE — PROGRESS NOTES
Patient cooperative with group in afternoon. No complaints of discomfort. Continues to be depressed.

## 2023-10-02 PROCEDURE — 1240000000 HC EMOTIONAL WELLNESS R&B

## 2023-10-02 PROCEDURE — 6370000000 HC RX 637 (ALT 250 FOR IP): Performed by: NURSE PRACTITIONER

## 2023-10-02 PROCEDURE — 6370000000 HC RX 637 (ALT 250 FOR IP): Performed by: PSYCHIATRY & NEUROLOGY

## 2023-10-02 PROCEDURE — 99232 SBSQ HOSP IP/OBS MODERATE 35: CPT | Performed by: NURSE PRACTITIONER

## 2023-10-02 RX ADMIN — ACETAMINOPHEN 650 MG: 325 TABLET ORAL at 11:00

## 2023-10-02 RX ADMIN — MELATONIN 3 MG ORAL TABLET 3 MG: 3 TABLET ORAL at 22:24

## 2023-10-02 RX ADMIN — HYDROXYZINE PAMOATE 50 MG: 50 CAPSULE ORAL at 13:04

## 2023-10-02 RX ADMIN — OXCARBAZEPINE 300 MG: 300 TABLET, FILM COATED ORAL at 09:14

## 2023-10-02 RX ADMIN — OXCARBAZEPINE 300 MG: 300 TABLET, FILM COATED ORAL at 22:24

## 2023-10-02 RX ADMIN — ARIPIPRAZOLE 5 MG: 5 TABLET ORAL at 09:14

## 2023-10-02 ASSESSMENT — PAIN DESCRIPTION - ORIENTATION: ORIENTATION: RIGHT

## 2023-10-02 ASSESSMENT — PAIN DESCRIPTION - DESCRIPTORS: DESCRIPTORS: ACHING;DISCOMFORT;POUNDING

## 2023-10-02 ASSESSMENT — PAIN SCALES - GENERAL
PAINLEVEL_OUTOF10: 6
PAINLEVEL_OUTOF10: 0
PAINLEVEL_OUTOF10: 0

## 2023-10-02 ASSESSMENT — PAIN DESCRIPTION - LOCATION: LOCATION: KNEE

## 2023-10-02 NOTE — GROUP NOTE
Group Therapy Note    Date: 10/2/2023    Group Start Time: 1120  Group End Time: 2896  Group Topic: Cognitive Skills    [facility]    CLAUDIA Sharp LSW        Group Therapy Note    Attendees: 12       Patient's Goal: to participate in group discussion on active listening. Notes: pt was an active listener in group discussion. Status After Intervention:  Improved    Participation Level:  Active Listener and Interactive    Participation Quality: Appropriate, Attentive, and Sharing      Speech:  normal      Thought Process/Content: Logical      Affective Functioning: Congruent      Mood: anxious      Level of consciousness:  Alert and Oriented x4      Response to Learning: Able to verbalize current knowledge/experience      Endings: None Reported    Modes of Intervention: Education, Support, Socialization, Exploration, Clarifying, and Problem-solving      Discipline Responsible: /Counselor      Signature:  CLAUDIA Moser LSW

## 2023-10-02 NOTE — BH NOTE
[facility]  Initial Interdisciplinary Treatment Plan NOTE    Review Date & Time: 10/2/23 0900    Patient was not in treatment team    Admission Type:        Reason for admission:         Estimated Length of Stay Update:  3-5 days  Estimated Discharge Date Update: 3-5 days    EDUCATION:   Learner Progress Toward Treatment Goals: Reviewed results and recommendations of this team, Reviewed group plan and strategies, Reviewed signs, symptoms and risk of self harm and violent behavior, and Reviewed goals and plan of care    Method: Small group    Outcome: Verbalized understanding    PATIENT GOALS: none at this time    PLAN/TREATMENT RECOMMENDATIONS UPDATE:Encourage patient to attend and participate in groups and take medications as prescribed    GOALS UPDATE:   Time frame for Short-Term Goals: reassess daily    Korina Reynolds RN

## 2023-10-02 NOTE — PLAN OF CARE
Pt denies suicidal ideation, homicidal ideation, and AV hallucinations. Pt is flat, sad, and isolative to self on the unit. She is calm, cooperative, and friendly. She states she is here again because \"I don't think I was on the right meds, I wasn't thinking clearly\". She is compliant with medications, and is attending groups. Problem: Self Harm/Suicidality  Goal: Will have no self-injury during hospital stay  Description: INTERVENTIONS:  1. Ensure constant observer at bedside with Q15M safety checks  2. Maintain a safe environment  3. Secure patient belongings  4. Ensure family/visitors adhere to safety recommendations  5. Ensure safety tray has been added to patient's diet order  6. Every shift and PRN: Re-assess suicidal risk via Frequent Screener    10/2/2023 1114 by Fer Jon RN  Outcome: Progressing     Problem: Depression  Goal: Will be euthymic at discharge  Description: INTERVENTIONS:  1. Administer medication as ordered  2. Provide emotional support via 1:1 interaction with staff  3. Encourage involvement in milieu/groups/activities  4. Monitor for social isolation  10/2/2023 1114 by Fer Jon RN  Outcome: Progressing     Problem: Behavior  Goal: Pt/Family maintain appropriate behavior and adhere to behavioral management agreement, if implemented  Description: INTERVENTIONS:  1. Assess patient/family's coping skills and  non-compliant behavior (including use of illegal substances)  2. Notify security of behavior or suspected illegal substances which indicate the need for search of the family and/or belongings  3. Encourage verbalization of thoughts and concerns in a socially appropriate manner  4. Utilize positive, consistent limit setting strategies supporting safety of patient, staff and others  5. Encourage participation in the decision making process about the behavioral management agreement  6.  If a visitor's behavior poses a threat to safety call refer to organization tolerated  10/2/2023 1114 by Codie Mcneil RN  Outcome: Progressing     Problem: Risk for Elopement  Goal: Patient will not exit the unit/facility without proper excort  10/2/2023 1114 by Codie Mcneil RN  Outcome: Progressing     Problem: Pain  Goal: Verbalizes/displays adequate comfort level or baseline comfort level  10/2/2023 1114 by Codie Mcneil RN  Outcome: Progressing

## 2023-10-02 NOTE — PROGRESS NOTES
Patient declined invitation to the following groups    Community Meeting  Education      Patient will continue to be provided with opportunities to enhance leisure skills/interests and/or coping mechanisms.

## 2023-10-02 NOTE — GROUP NOTE
Group Therapy Note    Date: 10/2/2023    Group Start Time: 1600  Group End Time: 1700  Group Topic: Recreational    [facility]    [facility]                                                                        Group Therapy Note    Date: 10/2/2023  Start Time: 1600  End Time:  1700  Number of Participants: 9    Type of Group: Recreational    Wellness Binder Information  Module Name:  Apples to Apples    Patient's Goal:  to increase socialization. ID potential new leisure interest    Notes:  CTRS facilitated a game of apples to apples. Patient social with peers, was observed smiling and laughing, and receptive to game. Status After Intervention:  Improved    Participation Level:  Active Listener and Interactive    Participation Quality: Appropriate and Attentive      Speech:  normal      Thought Process/Content: Logical      Affective Functioning: Congruent      Mood: euthymic      Level of consciousness:  Alert and Attentive      Response to Learning: Able to verbalize current knowledge/experience, Able to verbalize/acknowledge new learning, and Able to retain information      Endings: None Reported    Modes of Intervention: Education, Socialization, Activity      Discipline Responsible: Psychoeducational Specialist      Signature:  [facility]     Group Therapy Note    Attendees: 9

## 2023-10-03 PROCEDURE — 6370000000 HC RX 637 (ALT 250 FOR IP): Performed by: PSYCHIATRY & NEUROLOGY

## 2023-10-03 PROCEDURE — 6370000000 HC RX 637 (ALT 250 FOR IP): Performed by: NURSE PRACTITIONER

## 2023-10-03 PROCEDURE — 99232 SBSQ HOSP IP/OBS MODERATE 35: CPT | Performed by: NURSE PRACTITIONER

## 2023-10-03 PROCEDURE — 1240000000 HC EMOTIONAL WELLNESS R&B

## 2023-10-03 RX ORDER — ARIPIPRAZOLE 10 MG/1
10 TABLET ORAL DAILY
Status: DISCONTINUED | OUTPATIENT
Start: 2023-10-04 | End: 2023-10-06 | Stop reason: HOSPADM

## 2023-10-03 RX ADMIN — MELATONIN 3 MG ORAL TABLET 3 MG: 3 TABLET ORAL at 21:04

## 2023-10-03 RX ADMIN — HYDROXYZINE PAMOATE 50 MG: 50 CAPSULE ORAL at 08:40

## 2023-10-03 RX ADMIN — OXCARBAZEPINE 300 MG: 300 TABLET, FILM COATED ORAL at 08:40

## 2023-10-03 RX ADMIN — NICOTINE POLACRILEX 2 MG: 2 LOZENGE ORAL at 15:33

## 2023-10-03 RX ADMIN — ARIPIPRAZOLE 5 MG: 5 TABLET ORAL at 08:40

## 2023-10-03 RX ADMIN — OXCARBAZEPINE 300 MG: 300 TABLET, FILM COATED ORAL at 21:04

## 2023-10-03 RX ADMIN — HYDROXYZINE PAMOATE 50 MG: 50 CAPSULE ORAL at 16:50

## 2023-10-03 ASSESSMENT — PAIN SCALES - GENERAL: PAINLEVEL_OUTOF10: 0

## 2023-10-03 NOTE — PLAN OF CARE
Problem: Self Harm/Suicidality  Goal: Will have no self-injury during hospital stay  Description: INTERVENTIONS:  1. Ensure constant observer at bedside with Q15M safety checks  2. Maintain a safe environment  3. Secure patient belongings  4. Ensure family/visitors adhere to safety recommendations  5. Ensure safety tray has been added to patient's diet order  6. Every shift and PRN: Re-assess suicidal risk via Frequent Screener    10/3/2023 1112 by Hiren Goode RN  Outcome: Progressing  10/2/2023 2241 by Gayla Ibanez RN  Outcome: Progressing     Problem: Depression  Goal: Will be euthymic at discharge  Description: INTERVENTIONS:  1. Administer medication as ordered  2. Provide emotional support via 1:1 interaction with staff  3. Encourage involvement in milieu/groups/activities  4. Monitor for social isolation  Outcome: Progressing     Problem: Behavior  Goal: Pt/Family maintain appropriate behavior and adhere to behavioral management agreement, if implemented  Description: INTERVENTIONS:  1. Assess patient/family's coping skills and  non-compliant behavior (including use of illegal substances)  2. Notify security of behavior or suspected illegal substances which indicate the need for search of the family and/or belongings  3. Encourage verbalization of thoughts and concerns in a socially appropriate manner  4. Utilize positive, consistent limit setting strategies supporting safety of patient, staff and others  5. Encourage participation in the decision making process about the behavioral management agreement  6. If a visitor's behavior poses a threat to safety call refer to organization policy.   7. Initiate consult with , Psychosocial CNS, Spiritual Care as appropriate  10/3/2023 1112 by Hiren Goode RN  Outcome: Progressing  10/2/2023 2241 by Gayla Ibanez RN  Outcome: Progressing     Problem: Anxiety  Goal: Will report anxiety at manageable levels  Description:

## 2023-10-03 NOTE — BH NOTE
Patient is out on the unit, isolative to himself. Presents calm and cooperative during assessment. Patient denies suicidal ideation, homicidal ideations and hallucinations. Reports anxiety a 7/10 due to the other patients pacing the unit. States depression is 6/10 due to being away from her kids. Patient is discharge focused. No complaints or concerns verbalized at this time. No unit problems reported. Will continue to observe and support.

## 2023-10-03 NOTE — PLAN OF CARE
Problem: Self Harm/Suicidality  Goal: Will have no self-injury during hospital stay  Description: INTERVENTIONS:  1. Ensure constant observer at bedside with Q15M safety checks  2. Maintain a safe environment  3. Secure patient belongings  4. Ensure family/visitors adhere to safety recommendations  5. Ensure safety tray has been added to patient's diet order  6. Every shift and PRN: Re-assess suicidal risk via Frequent Screener    10/2/2023 2241 by Priya Caraballo RN  Outcome: Progressing     Problem: Behavior  Goal: Pt/Family maintain appropriate behavior and adhere to behavioral management agreement, if implemented  Description: INTERVENTIONS:  1. Assess patient/family's coping skills and  non-compliant behavior (including use of illegal substances)  2. Notify security of behavior or suspected illegal substances which indicate the need for search of the family and/or belongings  3. Encourage verbalization of thoughts and concerns in a socially appropriate manner  4. Utilize positive, consistent limit setting strategies supporting safety of patient, staff and others  5. Encourage participation in the decision making process about the behavioral management agreement  6. If a visitor's behavior poses a threat to safety call refer to organization policy. 7. Initiate consult with , Psychosocial CNS, Spiritual Care as appropriate  10/2/2023 2241 by Priya Caraballo RN  Outcome: Progressing     Problem: Anxiety  Goal: Will report anxiety at manageable levels  Description: INTERVENTIONS:  1. Administer medication as ordered  2. Teach and rehearse alternative coping skills  3.  Provide emotional support with 1:1 interaction with staff  10/2/2023 2241 by Priya Caraballo RN  Outcome: Progressing

## 2023-10-03 NOTE — CARE COORDINATION
SW met with pt to discuss discharge plan. PT states that she wants to go to inpatient rehab for her substance use at discharge. She states that she would prefer rehab in the [facility], but is open to referrals in [facility] if needed. PT reports that she plans to contact her government housing to see how her going to rehab is going to affect her housing, but states that she wants to go to inpatient rehab regardless. SW informed her that SW will need to wait to send referrals until she is doing better mentally. Pt verbalized understanding. Pt denied SI/HI/AVH. PT denied needing further support at this time.

## 2023-10-03 NOTE — PROGRESS NOTES
Patient attended morning community meeting. Updated on staffing assignments and daily expectations. Shared goal for the day as to make it thru the date.

## 2023-10-03 NOTE — PROGRESS NOTES
Patient denies thoughts to harm self or others, denies hallucinations, patient is compliant with medications, attends groups. Patient appetite is good, behavior in control. Patient is flat and depressed. Patient is social with peers.

## 2023-10-03 NOTE — GROUP NOTE
Group Therapy Note    Date: 10/3/2023    Group Start Time: 1100  Group End Time: 7156  Group Topic: Psychotherapy    [facility]  [address], [facility]        Group Therapy Note    Attendees: 6       Patient's Goal:  To increase socialization and improve interpersonal relationships. Notes:  Pt was an active participant in group discussion. Status After Intervention:  Improved    Participation Level: Active Listener and Interactive    Participation Quality: Appropriate, Attentive, Sharing, and Supportive      Speech:  normal      Thought Process/Content: Logical  Linear      Affective Functioning: Congruent      Mood: anxious and depressed      Level of consciousness:  Alert, Oriented x4, and Attentive      Response to Learning: Able to verbalize current knowledge/experience, Able to verbalize/acknowledge new learning, Able to retain information, Capable of insight, and Progressing to goal      Endings: None Reported    Modes of Intervention: Support, Socialization, and Exploration      Discipline Responsible: /Counselor      Signature:   CLAUDIA Gomez, [place]

## 2023-10-03 NOTE — GROUP NOTE
Group Therapy Note    Date: 10/3/2023    Group Start Time: 8100  Group End Time: 8081  Group Topic: Recreational    [facility] [address], [place]                                                                        Group Therapy Note    Date: 10/3/2023  Type of Group: Recreational    Wellness Binder Information  Module Name:  patients choice movie/cards/coloring     Patient's Goal:  Patient will be able to socialize and participate in activity of patients choice. Notes:  Pleasant and able to interact with others appropriately. Status After Intervention:  Improved    Participation Level:  Active Listener and Interactive    Participation Quality: Appropriate, Attentive, and Sharing      Speech:  normal      Thought Process/Content: Logical      Affective Functioning: Congruent      Mood: euthymic      Level of consciousness:  Alert, Oriented x4, and Attentive      Response to Learning: Able to verbalize/acknowledge new learning, Able to retain information, and Progressing to goal      Endings: None Reported    Modes of Intervention: Support, Socialization, and Activity      Discipline Responsible: Psychoeducational Specialist      Signature:  Osvaldo Rodriguez

## 2023-10-03 NOTE — GROUP NOTE
Group Therapy Note    Date: 10/3/2023    Group Start Time: 1010  Group End Time: 3520  Group Topic: Psychoeducation    [facility] [address], [place]                                                                              Group Therapy Note    Date: 10/3/2023  Module Name:  Communication and Soft Start up Skills   Patient's Goal:  Patient will be able to id active listening skills and learn soft start up starters. Notes:  Pleasant and sharing in group, willing to share when prompted. Accepting of handout. Status After Intervention:  Improved    Participation Level:  Active Listener and Interactive    Participation Quality: Appropriate, Attentive, and Sharing      Speech:  normal      Thought Process/Content: Logical      Affective Functioning: Congruent      Mood: euthymic      Level of consciousness:  Alert, Oriented x4, and Attentive      Response to Learning: Able to verbalize/acknowledge new learning, Able to retain information, and Progressing to goal      Endings: None Reported    Modes of Intervention: Education, Support, Socialization, Exploration, and Problem-solving      Discipline Responsible: Psychoeducational Specialist      Signature:  Brooke Ramos

## 2023-10-04 PROCEDURE — 6370000000 HC RX 637 (ALT 250 FOR IP): Performed by: PSYCHIATRY & NEUROLOGY

## 2023-10-04 PROCEDURE — 6370000000 HC RX 637 (ALT 250 FOR IP): Performed by: NURSE PRACTITIONER

## 2023-10-04 PROCEDURE — 1240000000 HC EMOTIONAL WELLNESS R&B

## 2023-10-04 PROCEDURE — 99232 SBSQ HOSP IP/OBS MODERATE 35: CPT | Performed by: NURSE PRACTITIONER

## 2023-10-04 RX ORDER — BENZTROPINE MESYLATE 1 MG/1
1 TABLET ORAL 2 TIMES DAILY
Status: DISCONTINUED | OUTPATIENT
Start: 2023-10-04 | End: 2023-10-06 | Stop reason: HOSPADM

## 2023-10-04 RX ADMIN — HYDROXYZINE PAMOATE 50 MG: 50 CAPSULE ORAL at 08:50

## 2023-10-04 RX ADMIN — BENZTROPINE MESYLATE 1 MG: 1 TABLET ORAL at 20:20

## 2023-10-04 RX ADMIN — BENZTROPINE MESYLATE 1 MG: 1 TABLET ORAL at 11:16

## 2023-10-04 RX ADMIN — ARIPIPRAZOLE 10 MG: 10 TABLET ORAL at 08:50

## 2023-10-04 RX ADMIN — MELATONIN 3 MG ORAL TABLET 3 MG: 3 TABLET ORAL at 20:20

## 2023-10-04 RX ADMIN — HYDROXYZINE PAMOATE 50 MG: 50 CAPSULE ORAL at 17:38

## 2023-10-04 RX ADMIN — OXCARBAZEPINE 300 MG: 300 TABLET, FILM COATED ORAL at 20:20

## 2023-10-04 RX ADMIN — OXCARBAZEPINE 300 MG: 300 TABLET, FILM COATED ORAL at 08:50

## 2023-10-04 NOTE — CARE COORDINATION
Pt was seen during treatment team. Pt is calm and appropriate, shares good eye contact. Pt reports that she is feeling much better, that she has some anxiety, but her mood is otherwise much improved. She reports that she is looking forward to going to inpatient rehab for her substance use and is excited for her future treatment and recovery. She denied SI/HI/AVH. SW faxed referrals to [facility], [facility], [place], [facility], and [place]. SW to follow up on referrals.

## 2023-10-04 NOTE — GROUP NOTE
Group Therapy Note    Date: 10/4/2023    Group Start Time: 1010  Group End Time: 1050  Group Topic: Psychoeducation    [facility] 1    Ashia Shaffer [place]                                                                        Group Therapy Note    Date: 10/4/2023  Type of Group: Psychoeducation    Wellness Binder Information  Module Name:  improving our social wellness    Patient's Goal:  Patient will be able to id characteristics on relationships   that were successful and how to stay away from red flags. Notes:  Pleasant and actively engaged in group lesson. Willing to share and accepting of  handout. Status After Intervention:  Improved    Participation Level:  Active Listener and Interactive    Participation Quality: Appropriate, Attentive, and Sharing      Speech:  normal      Thought Process/Content: Logical      Affective Functioning: Congruent      Mood: euthymic      Level of consciousness:  Alert, Oriented x4, and Attentive      Response to Learning: Able to verbalize/acknowledge new learning, Able to retain information, and Progressing to goal      Endings: None Reported    Modes of Intervention: Education, Support, Socialization, and Clarifying      Discipline Responsible: Psychoeducational Specialist      Signature:  Wai Sylvester

## 2023-10-04 NOTE — PROGRESS NOTES
Patient denies thoughts to harm self or others, denies hallucinations. Patient is compliant with medications, attends groups. Patient is social with peers and staff. Patient reports feeling \"very anxious, I can't sit still,I feel restless\". Patient appetite is good, behavior in control. Patient reads books in day area.  Pleasant and cooperative

## 2023-10-04 NOTE — PROGRESS NOTES
BEHAVIORAL HEALTH FOLLOW-UP NOTE     10/4/2023     Patient was seen and examined in person, Chart reviewed   Patient's case discussed with staff/team    Chief Complaint: \"I want to get help, I want to go to rehab. \"    Interim History: I saw patient this morning in treatment team.  She states that she is feeling good other than some anxiety. Her affect is brighter she had less irritability no anger outburst.  Denies suicidal ideations intent or plan denies any auditory or visual hallucinations eating well sleeping well no neurovegetative signs of depression she is been attending groups social with peers she is hopeful to go to rehab on discharge. Appetite: [x] Normal/Unchanged  [] Increased  [] Decreased      Sleep:       [x] Normal/Unchanged  [] Fair       [] Poor              Energy:    [x] Normal/Unchanged  [] Increased  [] Decreased        SI [] Present  [x] Absent    HI  []Present  [x] Absent     Aggression:  [] yes  [x] no    Patient is [x] able  [] unable to CONTRACT FOR SAFETY     PAST MEDICAL/PSYCHIATRIC HISTORY:   Past Medical History:   Diagnosis Date    H/O chronic bronchitis     most recent in Sept 2013    HTN (hypertension)     Knee strain, right, subsequent encounter 8/11/2020    Mental disorder     Migraine     Pregnancy     EDC 01/18/2012    Sciatica        FAMILY/SOCIAL HISTORY:  No family history on file.   Social History     Socioeconomic History    Marital status: Single     Spouse name: Not on file    Number of children: Not on file    Years of education: Not on file    Highest education level: Not on file   Occupational History    Not on file   Tobacco Use    Smoking status: Every Day     Packs/day: 0.25     Years: 10.00     Additional pack years: 0.00     Total pack years: 2.50     Types: Cigarettes     Last attempt to quit: 9/9/2013     Years since quitting: 10.0    Smokeless tobacco: Never   Vaping Use    Vaping Use: Never used   Substance and Sexual Activity    Alcohol use: No    Drug PM    OPIATESCREENURINE NOT DETECTED 08/05/2014 08:15 PM    PHENCYCLIDINESCREENURINE NOT DETECTED 08/05/2014 08:15 PM    ETOH <10 08/05/2014 07:20 PM     Lab Results   Component Value Date/Time    TSH 1.430 08/07/2014 06:10 AM     No results found for: \"LITHIUM\"  Lab Results   Component Value Date    VALPROATE 29 (L) 09/28/2023           Treatment Plan:  Reviewed current Medications with the patient. Risks, benefits, side effects, drug-to-drug interactions and alternatives to treatment were discussed. Collateral information:   CD evaluation  Encourage patient to attend group and other milieu activities.   Discharge planning discussed with the patient and treatment team.    Continue Abilify 10 mg daily  Continue Trileptal 300 mg twice daily  Cogentin 1 mg twice daily      PSYCHOTHERAPY/COUNSELING:  [x] Therapeutic interview  [x] Supportive  [] CBT  [] Ongoing  [] Other    [x] Patient continues to need, on a daily basis, active treatment furnished directly by or requiring the supervision of inpatient psychiatric personnel      Anticipated Length of stay: 3 to 7 days based on stability            Electronically signed by Dori Romberg, APRN - CNP on 59/3/9852 at 2:05 PM

## 2023-10-04 NOTE — PROGRESS NOTES
Patient attended morning community meeting. Updated on staffing assignments and daily expectations. Shared goal for the day as to try to stay positive.

## 2023-10-04 NOTE — CARE COORDINATION
DIPTI contacted [facility]  to follow up on pt referral. DIPTI spoke with Riky Farrar who states that he will check in with the admission coordinator and have them contact SW shortly. [facility] is an all male facility. DIPTI contacted [facility]  to follow up on referral. DIPTI spoke with Oliver Silva who states that pt referral is currently being reviewed and they will reach out to SW with answer once it is finished. DIPTI contacted [facility]  to follow up on pt referral. SW left voicemail. DIPTI sent communication to Madonna Badillo at [facility] to follow up on pt referral. SW awaiting response.

## 2023-10-04 NOTE — DISCHARGE INSTRUCTIONS
Follow up for Tobacco Cessation at:    [facility] Tobacco Treatment                                 Date:  Friday 10/13 at [address] [facility].  [address]   ([address] [place] [address] elevators to 7th floor)   Phone: [phone]   Fax: [phone]

## 2023-10-04 NOTE — CARE COORDINATION
SW contacted [facility]  to follow up on referral. SW left voicemail. SW contacted [facility]  to follow up on pt referral. SW spoke with Daphney York who states that pt is denied from their facility because of mental health concerns. They are suggesting pt find a duel diagnosis program.     SW awaiting response from Gigi Cowden at [facility].

## 2023-10-04 NOTE — GROUP NOTE
Group Therapy Note    Date: 10/4/2023    Group Start Time: 5167  Group End Time: 1815  Group Topic: Recreational    [facility] ACUTE BH 1    Dontae Pedersen                                                                        Group Therapy Note    Date: 10/4/2023  Module Name:  Patients choice  Patient's Goal:  patient will be able to socialize and interact with other patients while watching tv program,   playing cards or journal.     Notes:  Pleasant and engaged in group. Status After Intervention:  Improved    Participation Level:  Active Listener and Interactive    Participation Quality: Appropriate and Attentive      Speech:  normal      Thought Process/Content: Logical      Affective Functioning: Congruent      Mood: euthymic      Level of consciousness:  Alert, Oriented x4, and Attentive      Response to Learning: Able to verbalize/acknowledge new learning, Able to retain information, and Progressing to goal      Endings: None Reported    Modes of Intervention: Education, Support, and Socialization      Discipline Responsible: Psychoeducational Specialist      Signature:  Dontae Pedersen

## 2023-10-04 NOTE — PROGRESS NOTES
[facility]  Day 3 Interdisciplinary Treatment Plan NOTE    Review Date & Time: 10/4/2023 1000    Patient was in treatment team    Estimated Length of Stay Update:  3-5 days  Estimated Discharge Date Update: 10/7/2023    EDUCATION:   Learner Progress Toward Treatment Goals: Reviewed group plan and strategies    Method: Small group    Outcome: Verbalized understanding    PATIENT GOALS: \"try to stay  positive\"    PLAN/TREATMENT RECOMMENDATIONS UPDATE:medication compliance and group therapy attendance    GOALS UPDATE:   Time frame for Short-Term Goals: 3-5 days      Livia Martínez RN

## 2023-10-04 NOTE — PLAN OF CARE
Problem: Self Harm/Suicidality  Goal: Will have no self-injury during hospital stay  Description: INTERVENTIONS:  1. Ensure constant observer at bedside with Q15M safety checks  2. Maintain a safe environment  3. Secure patient belongings  4. Ensure family/visitors adhere to safety recommendations  5. Ensure safety tray has been added to patient's diet order  6. Every shift and PRN: Re-assess suicidal risk via Frequent Screener    Outcome: Progressing     Problem: Depression  Goal: Will be euthymic at discharge  Description: INTERVENTIONS:  1. Administer medication as ordered  2. Provide emotional support via 1:1 interaction with staff  3. Encourage involvement in milieu/groups/activities  4. Monitor for social isolation  10/4/2023 1114 by Regla Rojas RN  Outcome: Progressing  10/3/2023 2215 by Teresa Sawyer RN  Outcome: Progressing     Problem: Behavior  Goal: Pt/Family maintain appropriate behavior and adhere to behavioral management agreement, if implemented  Description: INTERVENTIONS:  1. Assess patient/family's coping skills and  non-compliant behavior (including use of illegal substances)  2. Notify security of behavior or suspected illegal substances which indicate the need for search of the family and/or belongings  3. Encourage verbalization of thoughts and concerns in a socially appropriate manner  4. Utilize positive, consistent limit setting strategies supporting safety of patient, staff and others  5. Encourage participation in the decision making process about the behavioral management agreement  6. If a visitor's behavior poses a threat to safety call refer to organization policy.   7. Initiate consult with , Psychosocial CNS, Spiritual Care as appropriate  10/4/2023 1114 by Regla Rojas RN  Outcome: Progressing  10/3/2023 2215 by Teresa Sawyer RN  Outcome: Progressing     Problem: Anxiety  Goal: Will report anxiety at manageable levels  Description:

## 2023-10-05 PROCEDURE — 6370000000 HC RX 637 (ALT 250 FOR IP): Performed by: NURSE PRACTITIONER

## 2023-10-05 PROCEDURE — 6370000000 HC RX 637 (ALT 250 FOR IP): Performed by: PSYCHIATRY & NEUROLOGY

## 2023-10-05 PROCEDURE — 99232 SBSQ HOSP IP/OBS MODERATE 35: CPT | Performed by: NURSE PRACTITIONER

## 2023-10-05 PROCEDURE — 1240000000 HC EMOTIONAL WELLNESS R&B

## 2023-10-05 RX ADMIN — BENZTROPINE MESYLATE 1 MG: 1 TABLET ORAL at 09:07

## 2023-10-05 RX ADMIN — HYDROXYZINE PAMOATE 50 MG: 50 CAPSULE ORAL at 15:53

## 2023-10-05 RX ADMIN — BENZTROPINE MESYLATE 1 MG: 1 TABLET ORAL at 20:58

## 2023-10-05 RX ADMIN — OXCARBAZEPINE 300 MG: 300 TABLET, FILM COATED ORAL at 20:58

## 2023-10-05 RX ADMIN — MELATONIN 3 MG ORAL TABLET 3 MG: 3 TABLET ORAL at 20:58

## 2023-10-05 RX ADMIN — HYDROXYZINE PAMOATE 50 MG: 50 CAPSULE ORAL at 09:08

## 2023-10-05 RX ADMIN — OXCARBAZEPINE 300 MG: 300 TABLET, FILM COATED ORAL at 09:07

## 2023-10-05 RX ADMIN — ARIPIPRAZOLE 10 MG: 10 TABLET ORAL at 09:07

## 2023-10-05 RX ADMIN — NICOTINE POLACRILEX 2 MG: 2 LOZENGE ORAL at 19:25

## 2023-10-05 RX ADMIN — NICOTINE POLACRILEX 2 MG: 2 LOZENGE ORAL at 09:07

## 2023-10-05 NOTE — CARE COORDINATION
DIPTI sent communication to Festus Ahuja at [facility] for an update on pt referral. He states that he will reach out to [place] and one of them will contact DIPTI.

## 2023-10-05 NOTE — GROUP NOTE
Group Therapy Note    Date: 10/5/2023    Group Start Time: 1100  Group End Time: 1150  Group Topic: Psychotherapy    [facility] ACUTE BH 1    Johan Ortiz MSW, LSW        Group Therapy Note    Attendees: 8       Patient's Goal:  To increase socialization and improve interpersonal relationships. Notes:  Pt was an active participant in group discussion. Status After Intervention:  Improved    Participation Level: Active Listener and Interactive    Participation Quality: Appropriate, Attentive, Sharing, and Supportive      Speech:  normal      Thought Process/Content: Logical  Linear      Affective Functioning: Congruent      Mood: anxious      Level of consciousness:  Alert, Oriented x4, and Attentive      Response to Learning: Able to verbalize current knowledge/experience, Able to verbalize/acknowledge new learning, Able to retain information, Capable of insight, and Progressing to goal      Endings: None Reported    Modes of Intervention: Support, Socialization, and Exploration      Discipline Responsible: /Counselor      Signature:   CLAUDIA Ortiz, [place]

## 2023-10-05 NOTE — GROUP NOTE
Group Therapy Note    Date: 10/5/2023    Group Start Time: 6557  Group End Time: 8410  Group Topic: Psychoeducation    [facility] 7[facility] ACUTE BH 2    OPAL Aleman                                                                        Group Therapy Note    Date: 10/5/2023  Start Time: 7834  End Time:  6967  Number of Participants: 10    Type of Group: Psychoeducation    Wellness Binder Information  Module Name:  A-Z emotions      Patient's Goal:  ID a variety of both positive and negative emotions and provide example of each    Notes:  [facility] nursing students facilitated A-Z emotion group with nursing instructor and CTRS present. Students had each patient pick an emotion and explain the emotion and what it means to them. Patient was active in participation and receptive to information provided. Status After Intervention:  Improved    Participation Level:  Active Listener and Interactive    Participation Quality: Appropriate and Sharing, Attentive      Speech:  normal      Thought Process/Content: Logical      Affective Functioning: Congruent      Mood: euthymic      Level of consciousness:  Alert and Attentive      Response to Learning: Able to verbalize current knowledge/experience, Able to verbalize/acknowledge new learning, and Able to retain information      Endings: None Reported    Modes of Intervention: Education, Support, Exploration, and Activity      Discipline Responsible: Psychoeducational Specialist      Signature:  Jose Aleman

## 2023-10-05 NOTE — GROUP NOTE
Group Therapy Note    Date: 10/5/2023    Group Start Time: 4927  Group End Time: 3910  Group Topic: Psychoeducation    [facility] ACUTE BH 2    Katie Mg                                                                        Group Therapy Note    Date: 10/5/2023  Start Time: 3898  End Time:  1050  Number of Participants: 14    Type of Group: Psychoeducation    Wellness Binder Information  Module Name:  Self-Love    Patient's Goal: ID one or two ways to improve self-love/esteem. Explore what outside influences shapes our self-love and self-esteem. Notes:  CTRS discussed self-love and the importance of practicing self-love to help boost self-esteem/image. Patient active in discussion and was accepting of handout provided. Patient encouraged to complete self-reflection worksheet. Status After Intervention:  Improved    Participation Level:  Active Listener and Interactive    Participation Quality: Appropriate, Attentive, and Sharing      Speech:  normal      Thought Process/Content: Logical      Affective Functioning: Congruent      Mood: euthymic      Level of consciousness:  Alert and Attentive      Response to Learning: Able to verbalize current knowledge/experience, Able to verbalize/acknowledge new learning, and Able to retain information      Endings: None Reported    Modes of Intervention: Education, Support, and Exploration      Discipline Responsible: Psychoeducational Specialist      Signature:  Katie Mg

## 2023-10-05 NOTE — PROGRESS NOTES
[facility] NOTE     10/5/2023     Patient was seen and examined in person, Chart reviewed   Patient's case discussed with staff/team    Chief Complaint: \" I am just feeling anxious today\"    Interim History: I saw patient this morning up on the unit. She states that she is feeling \"anxious today. \"  She denies suicidal ideations intent or plan denies auditory or visual hallucinations eating well sleeping well no neurovegetative signs depression no overt or covert signs psychosis she reports some anxiety but thinks that just from being here in the hospital she wants to go to rehab. Future focused    Appetite: [x] Normal/Unchanged  [] Increased  [] Decreased      Sleep:       [x] Normal/Unchanged  [] Fair       [] Poor              Energy:    [x] Normal/Unchanged  [] Increased  [] Decreased        SI [] Present  [x] Absent    HI  []Present  [x] Absent     Aggression:  [] yes  [x] no    Patient is [x] able  [] unable to CONTRACT FOR SAFETY     PAST MEDICAL/PSYCHIATRIC HISTORY:   Past Medical History:   Diagnosis Date    H/O chronic bronchitis     most recent in Sept 2013    HTN (hypertension)     Knee strain, right, subsequent encounter 8/11/2020    Mental disorder     Migraine     Pregnancy     EDC 01/18/2012    Sciatica        FAMILY/SOCIAL HISTORY:  No family history on file.   Social History     Socioeconomic History    Marital status: Single     Spouse name: Not on file    Number of children: Not on file    Years of education: Not on file    Highest education level: Not on file   Occupational History    Not on file   Tobacco Use    Smoking status: Every Day     Packs/day: 0.25     Years: 10.00     Additional pack years: 0.00     Total pack years: 2.50     Types: Cigarettes     Last attempt to quit: 9/9/2013     Years since quitting: 10.0    Smokeless tobacco: Never   Vaping Use    Vaping Use: Never used   Substance and Sexual Activity    Alcohol use: No    Drug use: Yes     Types: Marijuana Becca Larve)

## 2023-10-05 NOTE — PROGRESS NOTES
Patient is sociable with peers in the afternoon. Coloring with friends. No complaints of discomfort.

## 2023-10-05 NOTE — PLAN OF CARE
Problem: Self Harm/Suicidality  Goal: Will have no self-injury during hospital stay  Description: INTERVENTIONS:  1. Ensure constant observer at bedside with Q15M safety checks  2. Maintain a safe environment  3. Secure patient belongings  4. Ensure family/visitors adhere to safety recommendations  5. Ensure safety tray has been added to patient's diet order  6. Every shift and PRN: Re-assess suicidal risk via Frequent Screener    10/4/2023 2139 by Cr Guthrie RN  Outcome: Progressing     Problem: Depression  Goal: Will be euthymic at discharge  Description: INTERVENTIONS:  1. Administer medication as ordered  2. Provide emotional support via 1:1 interaction with staff  3. Encourage involvement in milieu/groups/activities  4. Monitor for social isolation  10/4/2023 2139 by Cr Guthrie RN  Outcome: Progressing     Problem: Behavior  Goal: Pt/Family maintain appropriate behavior and adhere to behavioral management agreement, if implemented  Description: INTERVENTIONS:  1. Assess patient/family's coping skills and  non-compliant behavior (including use of illegal substances)  2. Notify security of behavior or suspected illegal substances which indicate the need for search of the family and/or belongings  3. Encourage verbalization of thoughts and concerns in a socially appropriate manner  4. Utilize positive, consistent limit setting strategies supporting safety of patient, staff and others  5. Encourage participation in the decision making process about the behavioral management agreement  6. If a visitor's behavior poses a threat to safety call refer to organization policy.   7. Initiate consult with , Psychosocial CNS, Spiritual Care as appropriate  10/4/2023 2139 by Cr Guthrie RN  Outcome: Progressing

## 2023-10-05 NOTE — CARE COORDINATION
DIPTI received voicemail from Albert at [facility] stating that they have sent in pt clinicals for approval and SW should call her back. DIPTI contacted [place] and spoke with Bi Lassiter who states that they would like updated notes faxed for review. SW faxed updated notes.

## 2023-10-05 NOTE — BH NOTE
Patient denies suicidal ideation, homicidal ideations and AVH. Presents calm and cooperative during assessment. States depression is a zero and anxiety is a seven, which is her normal. Patient is out on the unit and is social with peers. Medications taken without issue. No complaints or concerns verbalized at this time. No unit problems reported. Will continue to observe and support.

## 2023-10-05 NOTE — CARE COORDINATION
SW spoke with Enrique Cortés at [facility] who states that they are able to accept pt tomorrow. He states that medications in hand are needed if possible, that he will reach out to SW to coordinate time shortly. NP informed.

## 2023-10-05 NOTE — PROGRESS NOTES
Patient in day area throughout day. Looks forward to rehab. No complaints discomfort. Sociable with peers. Patient medication compliant. Patient attends groups.

## 2023-10-05 NOTE — PROGRESS NOTES
Patient attended community meeting   Was updated on expectations of the unit, staffing, and programming  Patient shared goal for today as \"stay calm\"

## 2023-10-05 NOTE — PROGRESS NOTES
Spiritual Support Group Note    Number of Participants in Group:    12                    Time: 2    Goal: Relief from isolation and loneliness             Ester Sharing             Self-understanding and gain insight              Acceptance and belonging            Recognize they are not alone                Socialization             Empowerment       Encouragement    Topic:  [x] Spiritual Wellness and Self Care                  [x] Hope                     [] Connecting with Divine/Others        [] Thankfulness and Gratitude               [x]  Meaningfulness and Purpose               [] Forgiveness               [] Peace               [] Connect to [employer]      [] Other    Participation Level:   [x] Active Listener   [] Minimal   [] Monopolizing   [] Interactive   [] No Participation   []  Other:     Attention:   [x] Alert   [] Distractible   [] Drowsy   [] Poor   [] Other:    Manner:   [x] Cooperative   [] Suspicious   [] Withdrawn   [] Guarded   [] Irritable   [] Inhospitable   [] Other:     Others Comments from Group:

## 2023-10-05 NOTE — PLAN OF CARE
Problem: Self Harm/Suicidality  Goal: Will have no self-injury during hospital stay  Description: INTERVENTIONS:  1. Ensure constant observer at bedside with Q15M safety checks  2. Maintain a safe environment  3. Secure patient belongings  4. Ensure family/visitors adhere to safety recommendations  5. Ensure safety tray has been added to patient's diet order  6. Every shift and PRN: Re-assess suicidal risk via Frequent Screener    Outcome: Adequate for Discharge     Problem: Depression  Goal: Will be euthymic at discharge  Description: INTERVENTIONS:  1. Administer medication as ordered  2. Provide emotional support via 1:1 interaction with staff  3. Encourage involvement in milieu/groups/activities  4. Monitor for social isolation  Outcome: Progressing     Problem: Behavior  Goal: Pt/Family maintain appropriate behavior and adhere to behavioral management agreement, if implemented  Description: INTERVENTIONS:  1. Assess patient/family's coping skills and  non-compliant behavior (including use of illegal substances)  2. Notify security of behavior or suspected illegal substances which indicate the need for search of the family and/or belongings  3. Encourage verbalization of thoughts and concerns in a socially appropriate manner  4. Utilize positive, consistent limit setting strategies supporting safety of patient, staff and others  5. Encourage participation in the decision making process about the behavioral management agreement  6. If a visitor's behavior poses a threat to safety call refer to organization policy. 7. Initiate consult with , Psychosocial CNS, Spiritual Care as appropriate  Outcome: Progressing     Problem: Anxiety  Goal: Will report anxiety at manageable levels  Description: INTERVENTIONS:  1. Administer medication as ordered  2. Teach and rehearse alternative coping skills  3.  Provide emotional support with 1:1 interaction with staff  Outcome: Progressing     Pt denies SI, HI and GABRIEL. Pt out on the unit brightened and social. Friendly and cooperative. Medication compliant. Attends groups. Appetite appropriate. Will continue to monitor.

## 2023-10-06 VITALS
BODY MASS INDEX: 29.41 KG/M2 | WEIGHT: 183 LBS | DIASTOLIC BLOOD PRESSURE: 50 MMHG | RESPIRATION RATE: 14 BRPM | HEART RATE: 66 BPM | TEMPERATURE: 98 F | OXYGEN SATURATION: 96 % | HEIGHT: 66 IN | SYSTOLIC BLOOD PRESSURE: 106 MMHG

## 2023-10-06 PROCEDURE — 99239 HOSP IP/OBS DSCHRG MGMT >30: CPT | Performed by: NURSE PRACTITIONER

## 2023-10-06 PROCEDURE — 6370000000 HC RX 637 (ALT 250 FOR IP): Performed by: NURSE PRACTITIONER

## 2023-10-06 PROCEDURE — 6370000000 HC RX 637 (ALT 250 FOR IP): Performed by: PSYCHIATRY & NEUROLOGY

## 2023-10-06 RX ORDER — OXCARBAZEPINE 300 MG/1
300 TABLET, FILM COATED ORAL 2 TIMES DAILY
Qty: 60 TABLET | Refills: 0 | Status: SHIPPED | OUTPATIENT
Start: 2023-10-06 | End: 2023-11-05

## 2023-10-06 RX ORDER — POLYETHYLENE GLYCOL 3350 17 G
2 POWDER IN PACKET (EA) ORAL
Qty: 100 EACH | Refills: 3
Start: 2023-10-06 | End: 2023-11-05

## 2023-10-06 RX ORDER — ARIPIPRAZOLE 10 MG/1
10 TABLET ORAL DAILY
Qty: 30 TABLET | Refills: 0 | Status: SHIPPED | OUTPATIENT
Start: 2023-10-06 | End: 2023-11-05

## 2023-10-06 RX ADMIN — BENZTROPINE MESYLATE 1 MG: 1 TABLET ORAL at 08:39

## 2023-10-06 RX ADMIN — NICOTINE POLACRILEX 2 MG: 2 LOZENGE ORAL at 08:46

## 2023-10-06 RX ADMIN — HYDROXYZINE PAMOATE 50 MG: 50 CAPSULE ORAL at 08:45

## 2023-10-06 RX ADMIN — ARIPIPRAZOLE 10 MG: 10 TABLET ORAL at 08:39

## 2023-10-06 RX ADMIN — OXCARBAZEPINE 300 MG: 300 TABLET, FILM COATED ORAL at 08:39

## 2023-10-06 ASSESSMENT — PAIN SCALES - GENERAL: PAINLEVEL_OUTOF10: 0

## 2023-10-06 NOTE — GROUP NOTE
Group Therapy Note    Date: 10/6/2023    Group Start Time: 1010  Group End Time: 4727  Group Topic: Psychoeducation    [facility] ACUTE BH 2    Jose Virk                                                                        Group Therapy Note    Date: 10/6/2023  Start Time: 1010  End Time:  3301  Number of Participants: 17    Type of Group: Psychoeducation and Relaxation    Wellness Binder Information  Module Name:  Grounding techniques, 52171 method, and safe place  Patient's Goal:  ID two grounding techniques one can utilize upon discharge. ID a safe/happy place one can think about when feeling overwhelmed    Notes:  CTRS discussed different types of grounding techniques providing examples. CTRS demonstrated 38365 method and incorporated creation of a safe/happy place with the sense based grounding. Patient receptive to information provided and accepting of handouts. Patient able to ID one grounding technique that can be utilize upon discharge. Status After Intervention:  Improved    Participation Level:  Active Listener and Interactive    Participation Quality: Appropriate, Attentive, and Sharing      Speech:  normal      Thought Process/Content: Logical      Affective Functioning: Congruent      Mood: euthymic      Level of consciousness:  Alert and Attentive      Response to Learning: Able to verbalize current knowledge/experience, Able to verbalize/acknowledge new learning, and Able to retain information      Endings: None Reported    Modes of Intervention: Education, Support, and Exploration      Discipline Responsible: Psychoeducational Specialist      Signature:  Jose Virk

## 2023-10-06 NOTE — CARE COORDINATION
SW received notification from Mickie Cervantes at [facility] stating that they are able to transport pt at 11am today. SW informed him that transport should arrive at [facility] entrance and call nurse's station upon arrival. He verbalized understanding. Treatment team informed. DIPTI met with pt to discuss discharge to [facility] today. Pt is very happy that she is going to [facility], denied SI/HI/AVH. She states that she is looking forward to continuing her treatment to get better for her children. Pt denied questions/concerns at this time.

## 2023-10-06 NOTE — CARE COORDINATION
DIPTI contacted pt mother Erick Days [phone] (PAULETTE signed) to inform her of pt discharge for today. She states that she has no concerns for pt discharge to [place] today.

## 2023-10-06 NOTE — PROGRESS NOTES
CLINICAL PHARMACY NOTE: MEDS TO BEDS    Total # of Prescriptions Filled: 2   The following medications were delivered to the patient:  Oxcarbazepine 300 mg  Aripiprazole 10 mg    Additional Documentation:   Del[facility] to [facility]

## 2023-10-06 NOTE — PROGRESS NOTES
[facility]  Discharge Note    Pt discharged with followings belongings:   Clothing: Footwear, Undergarments, Shirt, Socks  Other Valuables: Keys   Valuables sent home with patient to rehab, along with safety plan, filled prescriptions, home medications and copy of AVS. All personal items were returned to patient. Patient educated on aftercare instructions: completed, reviewed and signed. Information faxed to rehab provider by  . Patient verbalize understanding of AVS:   yes with stated potential to comply to same. Status EXAM upon discharge:  Mental Status and Behavioral Exam  Normal: Yes  Level of Assistance: Independent/Self  Facial Expression: Elevated  Affect: Appropriate  Level of Consciousness: Alert  Frequency of Checks: 4 times per hour, close  Mood: Anxious, pleasant  Motor Activity:Normal: Yes  Motor Activity: Other (comment) (WNL)  Eye Contact: Good  Observed Behavior: Cooperative, Friendly  Sexual Misconduct History: Current - no  Preception: [place] to person, [place] to time, [place] to place, [place] to situation  Attention:Normal: Yes  Attention: Others (comment) (IMPROVED)  Thought Processes: Unremarkable  Thought Content:Normal: Yes  Thought Content: Other (comment) (RELEVANT)  Depression Symptoms: No problems reported or observed. Anxiety Symptoms: Generalized  Soni Symptoms: No problems reported or observed. Hallucinations: None  Delusions: No  Delusions: Other (comment) (NONE)  Memory:Normal: Yes  Memory: Other (comment) (INTACT)  Insight and Judgment: Yes  Insight and Judgment: Other (comment) (IMPROVED)    Tobacco Screening:  Practical Counseling, on admission, stacie X, if applicable and completed (first 3 are required if patient doesn't refuse):             ( x) Recognizing danger situations (included triggers and roadblocks)                    ( x) Coping skills (new ways to manage stress,relaxation techniques, changing routine, distraction)

## 2023-10-06 NOTE — PROGRESS NOTES
Patient attended community meeting   Was updated on expectations of the unit, staffing, and programming  Patient shared goal for today as \"stay calm and not be so anxious\"

## 2023-10-29 PROBLEM — N39.0 UTI (URINARY TRACT INFECTION): Status: RESOLVED | Noted: 2023-09-29 | Resolved: 2023-10-29

## 2024-03-17 ENCOUNTER — APPOINTMENT (OUTPATIENT)
Dept: GENERAL RADIOLOGY | Age: 39
End: 2024-03-17
Payer: COMMERCIAL

## 2024-03-17 ENCOUNTER — HOSPITAL ENCOUNTER (EMERGENCY)
Age: 39
Discharge: HOME | End: 2024-03-17
Payer: COMMERCIAL

## 2024-03-17 VITALS
SYSTOLIC BLOOD PRESSURE: 147 MMHG | OXYGEN SATURATION: 100 % | HEART RATE: 60 BPM | TEMPERATURE: 97.8 F | BODY MASS INDEX: 33.41 KG/M2 | WEIGHT: 207 LBS | DIASTOLIC BLOOD PRESSURE: 83 MMHG | RESPIRATION RATE: 16 BRPM

## 2024-03-17 DIAGNOSIS — S93.601A SPRAIN OF RIGHT FOOT, INITIAL ENCOUNTER: ICD-10-CM

## 2024-03-17 DIAGNOSIS — S93.401A SPRAIN OF RIGHT ANKLE, UNSPECIFIED LIGAMENT, INITIAL ENCOUNTER: Primary | ICD-10-CM

## 2024-03-17 PROCEDURE — 6370000000 HC RX 637 (ALT 250 FOR IP): Performed by: NURSE PRACTITIONER

## 2024-03-17 PROCEDURE — 73630 X-RAY EXAM OF FOOT: CPT

## 2024-03-17 PROCEDURE — 73610 X-RAY EXAM OF ANKLE: CPT

## 2024-03-17 PROCEDURE — 99283 EMERGENCY DEPT VISIT LOW MDM: CPT

## 2024-03-17 RX ORDER — ACETAMINOPHEN 325 MG/1
650 TABLET ORAL ONCE
Status: COMPLETED | OUTPATIENT
Start: 2024-03-17 | End: 2024-03-17

## 2024-03-17 RX ORDER — IBUPROFEN 600 MG/1
600 TABLET ORAL ONCE
Status: COMPLETED | OUTPATIENT
Start: 2024-03-17 | End: 2024-03-17

## 2024-03-17 RX ADMIN — IBUPROFEN 600 MG: 600 TABLET, FILM COATED ORAL at 15:06

## 2024-03-17 RX ADMIN — ACETAMINOPHEN 650 MG: 325 TABLET ORAL at 15:05

## 2024-03-17 ASSESSMENT — PAIN DESCRIPTION - FREQUENCY: FREQUENCY: CONTINUOUS

## 2024-03-17 ASSESSMENT — PAIN DESCRIPTION - LOCATION
LOCATION: FOOT
LOCATION: ANKLE

## 2024-03-17 ASSESSMENT — PAIN DESCRIPTION - ORIENTATION
ORIENTATION: RIGHT
ORIENTATION: RIGHT

## 2024-03-17 ASSESSMENT — PAIN - FUNCTIONAL ASSESSMENT: PAIN_FUNCTIONAL_ASSESSMENT: 0-10

## 2024-03-17 ASSESSMENT — PAIN DESCRIPTION - DESCRIPTORS: DESCRIPTORS: ACHING;DISCOMFORT;DULL

## 2024-03-17 ASSESSMENT — PAIN SCALES - GENERAL
PAINLEVEL_OUTOF10: 10
PAINLEVEL_OUTOF10: 7

## 2024-03-17 ASSESSMENT — PAIN DESCRIPTION - PAIN TYPE: TYPE: ACUTE PAIN

## 2024-03-17 ASSESSMENT — PAIN DESCRIPTION - ONSET: ONSET: ON-GOING

## 2024-03-17 NOTE — ED PROVIDER NOTES
with or any disagreements were addressed in the HPI.      REVIEW OF EXTERNAL NOTE :       none    REVIEW OF SYSTEMS :           Positives and Pertinent negatives as per HPI.     SURGICAL HISTORY     Past Surgical History:   Procedure Laterality Date     SECTION       SECTION      CHOLECYSTECTOMY      CHOLECYSTECTOMY, LAPAROSCOPIC N/A 10-10-13    HERNIA REPAIR      TUBAL LIGATION         CURRENTMEDICATIONS       Discharge Medication List as of 3/17/2024  2:51 PM        CONTINUE these medications which have NOT CHANGED    Details   ARIPiprazole (ABILIFY) 10 MG tablet Take 1 tablet by mouth daily, Disp-30 tablet, R-0Normal      nicotine polacrilex (COMMIT) 2 MG lozenge Take 1 lozenge by mouth every 2 hours as needed for Smoking cessation, Disp-100 each, R-3NO PRINT      OXcarbazepine (TRILEPTAL) 300 MG tablet Take 1 tablet by mouth 2 times daily, Disp-60 tablet, R-0Normal             ALLERGIES     Sulfa antibiotics    FAMILYHISTORY     History reviewed. No pertinent family history.     SOCIAL HISTORY       Social History     Tobacco Use    Smoking status: Every Day     Current packs/day: 0.00     Average packs/day: 0.3 packs/day for 10.0 years (2.5 ttl pk-yrs)     Types: Cigarettes     Start date: 2003     Last attempt to quit: 2013     Years since quitting: 10.5    Smokeless tobacco: Never   Vaping Use    Vaping Use: Every day   Substance Use Topics    Alcohol use: No    Drug use: Yes     Types: Marijuana (Weed)       SCREENINGS        [place] Coma Scale  Eye Opening: Spontaneous  Best Verbal Response: Oriented  Best Motor Response: Obeys commands  [place] Coma Scale Score: 15                CIWA Assessment  BP: (!) 147/83  Pulse: 60           PHYSICAL EXAM  1 or more Elements     ED Triage Vitals   BP Temp Temp Source Pulse Respirations SpO2 Height Weight - Scale   24 1238 24 1238 24 1238 24 1238 24 1238 24 1238 -- 24 1245   (!) 147/83 97.8 °F

## 2024-07-09 NOTE — CARE COORDINATION
History of Present Illness    
Date of Service: 24    
Chief Complaint: Ulceration of the right posterior calf    
History of Wound: This is a 77-year-old female who is a resident of [facility].  She suffers from bipolar disorder, depression, and   
dementia.  She is a poor historian.  She presented with an ulceration on the   
right posterior calf, age indeterminate.  The patient is known to be a   .  
 She has been advised in the past to wear compression stockings, but she refuse  
s, and is not compliant.  She has recently been prescribed cefadroxil 500 mg   
p.o. twice daily, for a 10-day course.  This was prescribed as a result of   
cultures of her ulceration which were performed on May 24, 2024, which revealed   
the presence of Staph aureus.  It is said that the patient suffers from swelling  
and edema in her lower extremities.  She sleeps on a flat mattress at night.    
However, she is not very active, and spends a good deal of each day sitting.    
She has a history of hypertension and hypothyroidism.  She otherwise denies a   
history of myocardial infarction, cerebrovascular accident, diabetes mellitus,   
renal disease, pulmonary disease, and hyperlipidemia.  The patient is of   
relatively normal body habitus, with a BMI of 19.5.     
     
    
    
[facility]    
Medical History (Reviewed 24 @ 14:27 by Dr. Evangelista Carvajal MD)    
    
Non-pressure chronic ulcer of calf    
Wound infection    
Leg edema    
Leg swelling    
Hypothyroidism    
Hypertension    
Urinary incontinence    
Dementia    
Depression    
Bipolar disorder    
Non-pressure chronic ulcer of lower leg    
    
    
                                Home Medications    
    
    
    
?Medication ?Instructions ?Recorded ?Last Taken ?Type    
     
Depakote 625 cap PO QHS 24 Unknown History    
     
aspirin 81 mg capsule 81 mg PO DAILY 24 Unknown History    
     
cholecalciferol (vitamin D3) 10 25 mcg PO DAILY 24 Unknown History    
    
mcg/mL (400 unit/mL) oral drops        
    
(Pedia D-Konstantin)        
     
donepezil 10 mg tablet 5 mg PO DAILY 24 Unknown History    
     
donepezil 5 mg tablet (Aricept) 5 mg PO QHS 24 Unknown History    
     
hydroxyzine HCl 25 mg tablet 25 mg PO Q8H PRN anxiety 24 Unknown History    
     
levothyroxine 50 mcg tablet 50 mcg PO DAILY 24 Unknown History    
     
loperamide 2 mg capsule 2 mg PO Q6H PRN loose stool 24 Unknown History    
     
melatonin 3 mg capsule 3 mg PO QHS 24 Unknown History    
     
mirtazapine 30 mg tablet 45 mg PO QHS 24 Unknown History    
     
mirtazapine 45 mg tablet 45 mg PO QHS 24 Unknown History    
    
    
    
                                            
    
    
    
Allergy/AdvReac Type Severity Reaction Status Date / Time    
     
carbamazepine Allergy Intermediate PT UNSURE Verified 24 13:16    
    
   OF REACTION      
    
    
    
Surgical History (Reviewed 24 @ 14:27 by Dr. Evangelista Carvajal MD)    
    
History of hip surgery    
    
    
Social History (Reviewed 24 @ 14:27 by Dr. Evangelista Carvajal MD)    
Smoking Status:  Former smoker     
    
    
    
Vital Signs    
Vital Signs    
Vital Signs:     
                                            
    
    
    
 24    
13:41    
     
Temperature 99.8 F H    
     
Temperature Source Temporal    
     
Pulse Rate 89    
     
Respiratory Rate 18    
     
Blood Pressure 117/79    
     
Blood Pressure Mean 91    
     
Blood Pressure Source Monitor    
     
Blood Pressure Position Sitting    
     
Blood Pressure Location Left Arm    
     
Oxygen Delivery Method Room Air    
    
    
                                     Weight    
    
    
    
Weight:                        107 lb                                             
    
     
Body Mass Index (BMI)          19.5                                               
    
    
    
    
    
    
Physical Exam    
Const    
alert, no apparent distress, average body habitus and well nourished    
Constitutional Narrative:     
The patient is alert and interactive, though confused.  The patient is of   
relatively normal body habitus, with a BMI of 19.5.    
General Appearance: cooperative, comfortable, well kempt and well developed    
Orientation / Consciousness: awake, confused and disoriented    
HEENT    
normocephalic and head/scalp atraumatic    
Head and Scalp: normal to inspection, normocephalic and atraumatic    
Face and Sinus: normal facial exam    
External Ear: external ears normal    
Eyes    
EOMs intact bilaterally    
General Eye: normal appearance of both eyes    
Resp    
normal respiratory effort, normal air movement, no retractions and no use of   
accessory muscles    
Effort and Inspection: able to speak in complete sentences    
Extremity    
no calf tenderness    
General Extremity: Negative for clubbing or cyanosis    
Skin    
Wound Narrative:     
A small eschar is noted on the patient's right posterior calf.  There are 2   
other small eschars nearby on the right calf.  Upon selective debridement, it is  
apparent that there is healing at each of the sites, and there are no remaining   
open wounds or ulcerations.  There is no significant swelling or edema noted in   
the patient's lower extremities at this time.      
Neuro    
CN's II-XII intact bilaterally, moves all extremities and no focal motor   
deficits    
Sensorium / Orientation: awake, alert, orientation impaired and confused    
Psych    
Appearance: grossly normal and appropriate    
Attitude: calm    
Activity / Motor Behavior: appropriate eye contact    
Speech: normal speech    
Mood & Affect: euthymic mood    
Thought Process: normal thought process    
Thought Content: normal thought content    
Attention / Concentration: attention grossly intact    
    
Debridement Note    
Debridement Note    
Wound debrided: Right posterior calf eschar and 2 nearby eschars    
Laterality: Right    
Type of Debridement: Selective debridement    
Anesthesia Used: 5% Lidocaine Gel    
Depth: Down to and including healthy tissue and in the subcutaneous layer    
Percentage of wound debrided: 100    
Instrument Used: 5mm curette    
Tissue Removed: Eschar    
Amount of bleeding with debridement: None    
Patient tolerated procedure: Patient tolerated procedure well    
Debridement Free Text: Using a 5 mm sterile curette, the eschar at each of the   
sites was removed, revealing a healed ulcer beneath.    
Post-Debridement Measurements and Additional Note:     
Post-Debridement Measurements/Treatment    
    
 - Nurse 1 - General Ulcer Assessment               Start:  24 14:30    
Freq:                                                 Status: Active            
Protocol:  JACK.MIO                                                            
    
    
Activity Type Activity Date Activity User E-sign Co-sign Detail    
    
Recorded Client Recorded Date Recorded By      
     
Document 24 14:30 KW       
    
z 24 14:36 KW       
     
Document 24 13:41 DS       
    
1 24 13:54 DS       
    
    
    
    
  24    
    
  14:30 13:41    
     
 - Today's Visit Information      
     
Type of service Follow-up Visit Follow-up Visit    
    
 (Physician/CNP (Physician/CNP    
    
 ) )    
     
Arrival Mode Ambulatory, Ambulatory    
    
 Walker     
     
Transfer Assistance None     
     
Accompanied by caregiver     
     
Patient Identification Verified (Name & Yes     
    
)      
     
Patient Requires Transmission-Based No     
    
Precautions      
     
Safety Precautions  Fall Prevention    
     
Height and Weight      
     
Body Mass Index (BMI) 19.5 19.5    
     
BMI Classification Normal Normal    
     
Vital Signs      
     
Temperature (97.8 F-99.1 F) 98.1 F 99.8 F H    
     
Temperature Source Temporal Temporal    
     
Pulse Rate () 70 89    
     
Pulse Location Monitor Monitor    
     
Respiratory Rate (12-18) 16 18    
     
Respiratory rate source Observation Observation    
     
Oxygen Delivery Method Room Air Room Air    
     
Blood Pressure (90//80) 166/64 H 117/79    
     
Blood Pressure Mean 98 91    
     
Source Monitor Monitor    
     
Position Sitting Sitting    
     
Blood Pressure Location Left Arm Left Arm    
     
History Since Last Visit- (Skip if this      
    
is Patient's initial visit)      
     
Have you changed medications since your No No    
    
last visit?      
     
Any new allergies or adverse reactions No No    
     
Had a fall/change in ADL's that may No No    
    
increase risk of falls      
     
Signs or symptoms of abuse and/or No     
    
neglect since last visit      
     
Have you been in the hospital since your No No    
    
last visit?      
     
Has dressing in place as prescribed Yes Yes    
     
Has compression in place as prescribed No No    
     
Has offloadiing in place as prescribed N/A No    
     
Experienced any changes in pain level or No No    
    
management      
     
Left Footwear Regular Shoe Regular Shoe    
     
Right Footwear Regular Shoe Regular Shoe    
     
Pain Scale: 0-10 Numeric      
     
Is Patient Pain Free? Yes Yes    
    
    
WC - Nurse 1 - General Ulcer Measurement              Start:  24 14:30    
Freq:                                                 Status: Active            
Protocol:                                                                       
    
    
Activity Type Activity Date Activity User E-sign Co-sign Detail    
    
Recorded Client Recorded Date Recorded By      
     
Document 24 14:30 KW       
    
z 24 14:36 KW       
     
Document 24 13:41 DS       
    
1 24 13:54 DS       
     
Edit Result 24 13:41 DS   (1)    
    
[id number] 24 13:59 DS       
    
    
(1) #1 RT POST LE                           
    - Necrosis Amt                      => Large (%)    
    - Necrotic Tissue Type              => Eschar    
    #2- R ANTERIOR LE CLUSTER               
    - Current Size (cm) - Length  1.0   => 9.0    
    - Current Size (cm) - Width   1.0   => 0.7    
    - Total Square Cm             1.00  => 6.30    
    - Photo Taken                       => No    
    - Necrosis Amt                      => Large (%)    
    - Necrotic Tissue Type              => Eschar    
    
    
  24    
    
  14:30 13:41    
     
Wound Center Nurse 1      
     
#1 RT POST LE      
     
 -Combined with other wound No No    
     
 -Current Size (cm) - Length 0.3 1.0    
     
 -Current Size (cm) - Width 0.2 1.0    
     
 -Current Size (cm) - Depth 0.1 0.1    
     
 -Total Square Cm 0.06 1.00    
     
 -Date of Last Picture (Recall this 24     
    
field)      
     
 -Photo Taken Yes     
     
 -Epithelialization None Present     
     
 -Tunneling No     
     
 -Undermining/Tunneling No     
     
 -Circular Undermining No     
     
 -Exudate Amt None Present     
     
 -Wound Margin Distinct, Distinct,    
    
 Outline Outline    
    
 Attached Attached    
     
 -Granulation Amt None Present (0     
    
 %)     
     
 -Slough/Fibrin Yes     
     
 -Necrosis Amt Large (%) Large (%)    
     
 -Necrotic Tissue Type Eschar Eschar    
     
 -Texture (Natalie-wound Skin Appearance) Assessed, Assessed    
    
 Scarring     
     
 -Moisture (Natalie-wound Skin Appearance) Assessed,Dry/ Assessed    
    
 Scaly     
     
 -Color (Natalie-wound Skin Appearance) Assessed Assessed    
     
 -Temperature (Natalie-wound Skin No Abnormality No Abnormality    
    
Appearance) (Pt Warm) (Pt Warm)    
     
 -Tenderness on Palpation (Natalie-wound No No    
    
Skin Appearance)      
     
 -Ulcer Cleansing Rinsed/ Soap and Water    
    
 Irrigated with     
    
 Saline     
     
 -Foul Odor after Cleansing No     
     
 -Anesthetic Used 5% Lidocaine 5% Lidocaine    
    
 Gel Gel    
     
 -Wound Comment(s) not wearing 3m     
    
 today     
     
#2- R ANTERIOR LE CLUSTER      
     
 -Combined with other wound No     
     
 -Current Size (cm) - Length 8.2 9.0    
     
 -Current Size (cm) - Width 5.5 0.7    
     
 -Current Size (cm) - Depth 0.1 0.1    
     
 -Total Square Cm 45.10 6.30    
     
 -Date of Last Picture (Recall this 24     
    
field)      
     
 -Photo Taken Yes No    
     
 -Epithelialization None Present     
     
 -Tunneling No No    
     
 -Undermining/Tunneling No No    
     
 -Circular Undermining No No    
     
 -Exudate Amt Medium     
     
 -Exudate Type Serosanguineous     
     
 -Wound Margin Distinct, Distinct,    
    
 Outline Outline    
    
 Attached Attached    
     
 -Granulation Amt Large (%)     
     
 -Granulation Quality Red     
     
 -Slough/Fibrin No     
     
 -Necrosis Amt None Present (0 Large (%)    
    
 %)     
     
 -Necrotic Tissue Type  Eschar    
     
 -Texture (Natalie-wound Skin Appearance) Assessed Assessed    
     
 -Moisture (Natalie-wound Skin Appearance) Assessed Assessed    
     
 -Color (Natalie-wound Skin Appearance) Assessed Assessed    
     
 -Temperature (Natalie-wound Skin No Abnormality No Abnormality    
    
Appearance) (Pt Warm) (Pt Warm)    
     
 -Tenderness on Palpation (Natalie-wound No No    
    
Skin Appearance)      
     
 -Ulcer Cleansing Rinsed/ Soap and Water    
    
 Irrigated with     
    
 Saline     
     
 -Foul Odor after Cleansing No     
     
 -Anesthetic Used 5% Lidocaine 5% Lidocaine    
    
 Gel Gel    
     
Right Calf (cm) 29.8     
     
Right Ankle (cm) 20.2     
    
    
WC - Nurse 2 - General Ulcer CM Notes                 Start:  24 14:30    
Freq:                                                 Status: Active            
Protocol:                                                                       
    
    
Activity Type Activity Date Activity User E-sign Co-sign Detail    
    
Recorded Client Recorded Date Recorded By      
     
Document 24 14:58 JF       
    
[id number] 24 15:04 JF       
     
Document 24 14:07 JF       
    
0000 24 14:08 JF       
     
Edit Result 24 14:07 JF   (1)    
    
0000 24 14:12 JF       
    
    
(1) #2- R ANTERIOR LE CLUSTER                              
    - Correct Patient                  No              => Yes    
    - Correct Side, Site, Position     No              => Yes    
    - Correct Procedure                No              => Yes    
    - Procedure Performed              No              => Yes    
    - Type of Procedure                                => Debridement    
    - Clinical Debridement                             => Epidermis / Dermis    
    - Tissue Removed                                   => Epidermis,Dermis    
    - Tunneling                                        => No    
    - Undermining/Tunneling                            => No    
    - Circular Undermining                             => No    
    - Wound/Ulcer Outcome              Healed-         => Not Healed    
                                       Epithelialized  =>     
    - Debridement - Open, 1st 20sq cm                  => Yes    
    
    
  24    
    
  14:58 14:07    
     
Wound Center Nurse 2      
     
#1 RT POST LE      
     
 -Time 14:59     
     
 -Correct Patient Yes No    
     
 -Correct Side, Site, Position Yes No    
     
 -Correct Procedure Yes No    
     
 -Procedure Performed Yes No    
     
 -Type of Procedure Debridement     
     
 -Clinical Debridement Subcutaneous     
     
 -Tissue Removed Subcutaneous     
     
 -Post Debridement (cm) - Length 0.5 0    
     
 -Post Debridement (cm) - Width 0.4 0    
     
 -Post Debridement (cm) - Depth 0.1 0    
     
 -Total Square (Post) (cm) 0.20 0    
     
 -Area of Debridement (cm) - Length 0.5 0    
     
 -Area of Debridement (cm) - Width 0.4 0    
     
 -Total Square (Area) (cm) 0.20 0    
     
 -Tunneling No     
     
 -Undermining/Tunneling No     
     
 -Circular Undermining No     
     
 -Wound/Ulcer Outcome Not Healed Healed-    
    
  Epithelialized    
     
 -Ulcer Cleansing Rinsed/     
    
 Irrigated with     
    
 Saline     
     
 -Foul Odor after Cleansing No     
     
 -Bioengineered Tissue No     
     
 -Bleeding Controlled with Pressure     
     
 -Treatment Response Procedure     
    
 Tolerated Well     
     
 -Offloading No     
     
 -Debridement - Subq, 1st 20sq cm Yes     
     
#2- R ANTERIOR LE CLUSTER      
     
 -Correct Patient No Yes    
     
 -Correct Side, Site, Position No Yes    
     
 -Correct Procedure No Yes    
     
 -Procedure Performed No Yes    
     
 -Type of Procedure  Debridement    
     
 -Clinical Debridement  Epidermis /    
    
  Dermis    
     
 -Tissue Removed  Epidermis,    
    
  Dermis    
     
 -Post Debridement (cm) - Length  0    
     
 -Post Debridement (cm) - Width  0    
     
 -Post Debridement (cm) - Depth  0    
     
 -Total Square (Post) (cm)  0    
     
 -Area of Debridement (cm) - Length  0    
     
 -Area of Debridement (cm) - Width  0    
     
 -Total Square (Area) (cm)  0    
     
 -Tunneling  No    
     
 -Undermining/Tunneling  No    
     
 -Circular Undermining  No    
     
 -Wound/Ulcer Outcome Not Healed Not Healed    
     
 -Debridement - Open, 1st 20sq cm  Yes    
     
Pain Scale: 0-10 Numeric      
     
Is Patient Pain Free? Yes Yes    
    
    
 - Nurse 3 - General Ulcer D/C NN                   Start:  24 14:30    
Freq:                                                 Status: Active            
Protocol:                                                                       
    
    
Activity Type Activity Date Activity User E-sign Co-sign Detail    
    
Recorded Client Recorded Date Recorded By      
     
Document 24 15:15 KW       
    
z 24 15:16 KW       
    
    
    
    
  24    
    
  15:15    
     
Wound Care Center Nurse 3     
     
#1 RT POST LE     
     
 -Other Dressing OWN DIANE    
     
 -Primary Dressing Covered/Secured with Dry Gauze &    
    
 Roll Gauze,    
    
 Secured with    
    
 Tape    
     
#2- R ANTERIOR LE CLUSTER     
     
 -Primary Dressing Covered/Secured with Dry Gauze &    
    
 Roll Gauze,    
    
 Secured with    
    
 Tape    
     
Right     
     
 -Multi-Layered Wrap Application Unna Boot -    
    
 Right ($)    
     
Pain Scale: 0-10 Numeric     
     
Is Patient Pain Free? Yes    
     
 - Visit Discharge     
     
Discharge Condition Stable    
     
Ambulatory Status Ambulatory,    
    
 Walker    
     
Medication Reconcilliation completed & No    
    
provided to patient/care provider     
     
Clinical Summary of Care Provided Yes    
    
    
    
    
    
Charges/Coding    
Visit Charges    
Office Visits / Consults: [id number] OV L3 Est 20min    
    
Assessment/Plan    
Assessment/Plan    
(1) Non-pressure chronic ulcer of calf:     
CODE(S):       L97.209 - Non-pressure chronic ulcer of unspecified calf with   
unspecified severity    
QUALIFIERS:               Laterality: right  Non-pressure ulcer stage: with fat   
layer exposed  Qualified Code(s): L97.212 - Non-pressure chronic ulcer of right   
calf with fat layer exposed    
(2) Non-pressure chronic ulcer of lower leg:     
CODE(S):       L97.909 - Non-pressure chronic ulcer of unspecified part of   
unspecified lower leg with unspecified severity    
QUALIFIERS:               Laterality: right  Non-pressure ulcer stage: with fat   
layer exposed  Qualified Code(s): L97.912 - Non-pressure chronic ulcer of   
unspecified part of right lower leg with fat layer exposed    
(3) Wound infection:     
CODE(S):       T14.8XXA - Other injury of unspecified body region, initial   
encounter; L08.9 - Local infection of the skin and subcutaneous tissue,   
unspecified    
(4) Leg swelling:     
CODE(S):       M79.89 - Other specified soft tissue disorders    
(5) Leg edema:     
CODE(S):       R60.0 - Localized edema    
(6) Bipolar disorder:     
CODE(S):       F31.9 - Bipolar disorder, unspecified    
(7) Depression:     
CODE(S):       F32.A - Depression, unspecified    
(8) Dementia:     
CODE(S):       F03.90 - Unspecified dementia, unspecified severity, without   
behavioral disturbance, psychotic disturbance, mood disturbance, and anxiety    
(9) Urinary incontinence:     
CODE(S):       R32 - Unspecified urinary incontinence    
(10) Hypertension:     
CODE(S):       I10 - Essential (primary) hypertension    
(11) Hypothyroidism:     
CODE(S):       E03.9 - Hypothyroidism, unspecified    
(12) History of hip surgery:     
CODE(S):       Z98.890 - Other specified postprocedural states    
PLAN:     
Plan    
This is a 77-year-old female who presented with an ulceration on the right   
posterior calf, age-indeterminate.  The patient is a poor historian.  She   
suffers from dementia, bipolar disorder, and depression.  The etiology of the   
patient's ulceration is uncertain.  The ulceration appeared generally healthy   
and well-vascularized.  There was no sign of infection or cellulitis at this   
time.  The ulceration and nearby excoriations are now completely healed and   
epithelialized.  The patient is to be discharged, and will follow-up henceforth   
on an as-needed basis.  Because she is a   , we are to wrap the right lowe  
r extremity with a 3M 2 layer compression wrap, which is to remain in place for   
about 3 days.  Thereafter, patient is to wear graduated compression stockings of  
15 to 20 mmHg compression, which are to be donned on a daily basis.  She has   
been provided a prescription for such stockings, which are to be obtained at a   
local medical supply store.  Because of the ulceration and all associated   
excoriations are completely healed, the patient will be discharged.      
    
Total time: 22 minutes SW received voicemail from Liz at [address] [place] [address] stating that they are on a 10 day waitlist for female residential beds.

## 2024-07-10 NOTE — DISCHARGE SUMMARY
Appointment Reminder>Left patient message regarding to appointment tomorrow at 7:40am . Any questions, please call us at [phone].     [facility]   Concentration intact  Memory intact  Insight good   Judgement fair   Fund of Knowledge adequate      ASSESSMENT:  Patient symptoms are:  [x] Well controlled  [x] Improving  [] Worsening  [] No change    Reason for more than one antipsychotic:  [x] N/A  [] 3 Failed Monotherapy attempts (Drugs tried:)  [] Crossover to a new antipsychotic  [] Taper to Monotherapy from Polypharmacy  [] Augmentation of clozapine therapy due to treatment resistance to single therapy    Diagnosis:  Principal Problem:    Bipolar disorder, current episode depressed, severe (HCC)  Active Problems:    Polysubstance abuse ([address])  Resolved Problems:    Bipolar 1 disorder ([address])      LABS:    No results for input(s): \"WBC\", \"HGB\", \"PLT\" in the last 72 hours. No results for input(s): \"NA\", \"K\", \"CL\", \"CO2\", \"BUN\", \"CREATININE\", \"GLUCOSE\" in the last 72 hours. No results for input(s): \"BILITOT\", \"ALKPHOS\", \"AST\", \"ALT\" in the last 72 hours. Lab Results   Component Value Date/Time    LABAMPH NOT DETECTED 08/05/2014 08:15 PM    BARBSCNU NEGATIVE 09/28/2023 07:45 PM    LABBENZ NEGATIVE 09/28/2023 07:45 PM    LABMETH NEGATIVE 09/28/2023 07:45 PM    OPIATESCREENURINE NOT DETECTED 08/05/2014 08:15 PM    PHENCYCLIDINESCREENURINE NOT DETECTED 08/05/2014 08:15 PM    ETOH <10 08/05/2014 07:20 PM     Lab Results   Component Value Date/Time    TSH 1.430 08/07/2014 06:10 AM     No results found for: \"LITHIUM\"  Lab Results   Component Value Date    VALPROATE 29 (L) 09/28/2023       RISK ASSESSMENT AT DISCHARGE: Low risk for suicide and homicide. Treatment Plan:  Reviewed current Medications with the patient. Education provided on the complaince with treatment. Risks, benefits, side effects, drug-to-drug interactions and alternatives to treatment were discussed. Encourage patient to attend outpatient follow up appointment and therapy.     Patient was advised to call the outpatient provider, visit the nearest ED or call 911 if symptoms are not all medications outpatient follow-up appointments    Patient is discharged home in stable condition      TIME SPEND - 35 MINUTES TO COMPLETE THE EVALUATION, DISCHARGE SUMMARY, MEDICATION RECONCILIATION AND FOLLOW UP CARE     Signed:  DONALD Pham CNP  [id number]  2:09 PM